# Patient Record
Sex: FEMALE | Employment: UNEMPLOYED | URBAN - METROPOLITAN AREA
[De-identification: names, ages, dates, MRNs, and addresses within clinical notes are randomized per-mention and may not be internally consistent; named-entity substitution may affect disease eponyms.]

---

## 2019-02-13 PROBLEM — G62.9 SMALL FIBER NEUROPATHY: Status: ACTIVE | Noted: 2019-02-13

## 2019-02-13 PROBLEM — M43.17 SPONDYLOLISTHESIS OF LUMBOSACRAL REGION: Status: ACTIVE | Noted: 2019-02-13

## 2019-02-21 ENCOUNTER — HOSPITAL ENCOUNTER (OUTPATIENT)
Dept: PHYSICAL THERAPY | Age: 54
Discharge: HOME | End: 2019-02-21
Attending: PSYCHIATRY & NEUROLOGY
Payer: COMMERCIAL

## 2019-02-21 DIAGNOSIS — M43.17 SPONDYLOLISTHESIS OF LUMBOSACRAL REGION: ICD-10-CM

## 2019-02-21 PROCEDURE — 97162 PT EVAL MOD COMPLEX 30 MIN: CPT

## 2019-02-21 PROCEDURE — 97110 THERAPEUTIC EXERCISES: CPT

## 2019-02-21 NOTE — PROGRESS NOTES
Joan Crews  : 1965  Primary: [employer]  Secondary:  [address] at [place] [id number] [facility], [address] [place] [address],[address], [facility] [address].  [phone]   [phone]           OUTPATIENT PHYSICAL THERAPY:Initial Assessment 2019   ICD-10: Treatment Diagnosis: Low back pain (M54.5)  Precautions/Allergies:   Patient has no known allergies. MD Orders: Evaluate and Treat MEDICAL/REFERRING DIAGNOSIS:  Spondylolisthesis of lumbosacral region [M43.17]   DATE OF ONSET: Chronic LBP  REFERRING PHYSICIAN: Roney Dinh MD  RETURN PHYSICIAN APPOINTMENT: end      INITIAL ASSESSMENT:  Ms. Cony Reyez presents with decreased lumbar ROM, LE strength/flexibility and increased pain leading to decreased functional status. Pt would benefit from skilled physical therapy services to address the above deficits and help patient return to prior level of function. PROBLEM LIST (Impacting functional limitations):  1. Decreased Strength  2. Decreased ADL/Functional Activities  3. Increased Pain  4. Decreased Flexibility/Joint Mobility  5. Decreased [place] with Home Exercise Program INTERVENTIONS PLANNED: (Treatment may consist of any combination of the following)  1. Cold  2. Cryotherapy  3. Electrical Stimulation  4. Heat  5. Home Exercise Program (HEP)  6. Manual Therapy  7. Range of Motion (ROM)  8. Therapeutic Exercise/Strengthening  9. Ultrasound (US)   10. Aquatic Therapy   TREATMENT PLAN:  Effective Dates: 2019 TO 2019 (90 days). Frequency/Duration: 2 times a week for 90 Day(s)  GOALS: (Goals have been discussed and agreed upon with patient.)  Short-Term Functional Goals: Time Frame: 4 weeks  1. Pt will increase lumbar ROM flexion = 80 degrees, side bending = 35 degrees bilaterally to assist with household ADL's  2. Pt will increase SLR 60 degrees bilaterally to assist with household ADL's  3.  Pt will be independent with HEP  Discharge Goals: Time Frame: 12 weeks  1. Pt will increase strength bilateral LE's 5/5 to assist with household ADL's  2. Pt will perform 20 minutes household cleaning activities independently with min to no c/o LBP or LE pain  Rehabilitation Potential For Stated Goals: Good  Regarding Damion Balbuena's therapy, I certify that the treatment plan above will be carried out by a therapist or under their direction. Thank you for this referral,  Jadon Oneill, PT     Referring Physician Signature: Dev Nicholson MD              Date                    The information in this section was collected on 02-21-19 (except where otherwise noted). HISTORY:   History of Present Injury/Illness (Reason for Referral):  Pt reports insidious onset low back and LE pain of greater than 5 years duration. She states she had some physical therapy years ago and was seeing a chiropractor, but the chiropractics made her symptoms worse at her last session. .  She states she has good and bad days. She states the pain hits her at times even if she isn't doing anything. She currently c/o pain in her bilateral low back with pain in her L buttocks and a tingling type sensation down her L LE. She is also getting acupuncture every 3-4 weeks. She states there are no specific aggravating factors or relieving factors. She rates her current pain 1-2/10, increasing to 10/10 at worst.  She is taking Advil prn for her back and LE pain. Pt reports difficulties with vacuuming her house as well as difficulties with prolonged walking due to her back pain. Pt states she has had some massage therapy which made her symptoms worse. Past Medical History/Comorbidities:   Ms. Miguel Adames  has a past medical history of Thyroid disease. Ms. Miguel Adames  has no past surgical history on file. Social History/Living Environment:     Pt lives with spouse in a two-story house.   She denies increased pain with ascending/descending stairs  Prior Level of Function/Work/Activity:  Pt is not employed  Dominant Side:         RIGHT  Other Clinical Tests:          MRI several years ago revealed L5-S1 spondylolisthesis  Personal Factors:          Sex:  female        Age:  48 y.o. Profession:  Pt is not employed     Ambulatory/Rehab Services H2 Model Falls Risk Assessment    Risk Factors:       No Risk Factors Identified Ability to Rise from Chair:       (1)  Pushes up, successful in one attempt    Falls Prevention Plan:       No modifications necessary   Total: (5 or greater = High Risk): 1    ©2010 [facility] [address] [place] [address] Patent #[id number]. Federal Law prohibits the replication, distribution or use without written permission from [facility] [employer]     Current Medications:       Current Outpatient Medications:     levothyroxine (LEVOXYL) 88 mcg tablet, Take 88 mcg by mouth daily. , Disp: , Rfl:     T3 SUSTAINED RELEASE CAP, Take  by mouth daily. T3 Hypomellose, Disp: , Rfl:     cyanocobalamin (VITAMIN B12) 1,000 mcg/mL injection, 1,000 mcg by IntraMUSCular route once., Disp: , Rfl:     clonazePAM (KLONOPIN) 0.25 mg disintegrating tablet, Take 0.25 mg by mouth nightly., Disp: , Rfl:     multivitamin (ONE A DAY) tablet, Take 1 Tab by mouth daily. , Disp: , Rfl:     OTHER,NON-FORMULARY,, Compound hormones, Disp: , Rfl:     OTHER,NON-FORMULARY,, Pancreatic enzymes, Disp: , Rfl:     gabapentin (NEURONTIN) 100 mg capsule, Take 1 Cap by mouth nightly., Disp: 31 Cap, Rfl: 5   Date Last Reviewed:  02-21-19   Number of Personal Factors/Comorbidities that affect the Plan of Care: 1-2: MODERATE COMPLEXITY   EXAMINATION:   Observation/Orthostatic Postural Assessment:           Forward head, rounded shoulders  Palpation:          Mild tenderness bilateral lumbar paraspinals L>R, piriformis, gluteals, hamstrings and gastroc  ROM:    Lumbar flexion = 70 degrees (\"pulling\" in bilateral low back and posterior thighs)  Lumbar extension = not assessed  Lumbar side bending R = 25 degrees  Lumbar side bending L = 25 degrees    Strength:    R hip flexion = 4+/5  R hip abduction = 4+/5  R hip adduction = 5/5  R knee extension = 5/5  R knee flexion = 5/5  R ankle dorsiflexion = 5/5  R ankle plantarflexion = 5/5    L hip flexion = 4+/5  L hip abduction = 4+/5  L hip adduction = 5/5  L knee extension = 5/5  L knee flexion = 5/5  L ankle dorsiflexion = 5/5  L ankle plantarflexion = 5/5    Special Tests:          SLR 45 degrees with hamstring tightness noted bilaterally  Neurological Screen:        Myotomes:  No significant deficits noted        Dermatomes:  Intact to light touch bilaterally        Reflexes:  DTR's 2+ to bilateral achilles and patellar  Functional Mobility:         Gait/Ambulation:  No significant deficits noted        Transfers:  Pt able to transition sit to supine and supine to sit independently    Body Structures Involved:  1. Nerves  2. Bones  3. Joints  4. Muscles Body Functions Affected:  1. Sensory/Pain  2. Neuromusculoskeletal Activities and Participation Affected:  1. Mobility  2. Self Care  3. Domestic Life   Number of elements (examined above) that affect the Plan of Care: 3: MODERATE COMPLEXITY   CLINICAL PRESENTATION:   Presentation: Evolving clinical presentation with changing clinical characteristics: MODERATE COMPLEXITY   CLINICAL DECISION MAKING:   Outcome Measure: Tool Used: Modified Oswestry Low Back Pain Questionnaire  Score:  Initial: 13/50  Most Recent: X/50 (Date: -- )   Interpretation of Score: Each section is scored on a 0-5 scale, 5 representing the greatest disability. The scores of each section are added together for a total score of 50. Medical Necessity:   · Patient is expected to demonstrate progress in strength, range of motion and functional technique to increase independence with household ADL's. · Patient demonstrates good rehab potential due to higher previous functional level.   Reason for Services/Other Comments:  · Patient continues to require skilled intervention due to decreased lumbar ROM, LE strength/flexibility and increased pain leading to decreased functional status. Use of outcome tool(s) and clinical judgement create a POC that gives a: Questionable prediction of patient's progress: MODERATE COMPLEXITY            TREATMENT:   (In addition to Assessment/Re-Assessment sessions the following treatments were rendered)  Pre-treatment Symptoms/Complaints:  LBP, LE pain  Pain: Initial:     1-2/10 Post Session:  1-2/10     THERAPEUTIC EXERCISE: (10 minutes):  Exercises per grid below to improve mobility and strength. Required minimal verbal cues to promote proper body alignment and promote proper body posture. Progressed resistance and repetitions as indicated. Date:  02-21-19 Date:   Date:     Activity/Exercise Parameters Parameters Parameters   Active Hamstring with Ankle Pumps for Nerve Glide 3 reps  10 ankle pumps each     DKTC 3 reps  20 sec holds     Pelvic Tilts 15 reps  5 sec holds                                 [facility] Portal  Treatment/Session Assessment:    · Response to Treatment:  Pt tolerated evaluation and treatments well today with no c/o increased pain. · Compliance with Program/Exercises: Will assess as treatment progresses. · Recommendations/Intent for next treatment session: \"Next visit will focus on advancements to more challenging activities\".   Total Treatment Duration:  10 minutes  PT Patient Time In/Time Out  Time In: 1115  Time Out: [address] Tsai, PT    Future Appointments   Date Time Provider Debby Gerardi   2/26/2019  5:00 PM Vivian Dang, [facility]   2/28/2019  9:45 AM Vivian Dang, PT [facility]   3/25/2019  8:30 AM Freeman Payton MD BSN [facility]   3/27/2019 10:30 AM Susana Mcdonough MD BSND BSND

## 2019-02-26 ENCOUNTER — HOSPITAL ENCOUNTER (OUTPATIENT)
Dept: PHYSICAL THERAPY | Age: 54
Discharge: HOME | End: 2019-02-26
Attending: PSYCHIATRY & NEUROLOGY
Payer: COMMERCIAL

## 2019-02-26 PROCEDURE — 97110 THERAPEUTIC EXERCISES: CPT

## 2019-02-26 PROCEDURE — 97140 MANUAL THERAPY 1/> REGIONS: CPT

## 2019-02-26 PROCEDURE — 97014 ELECTRIC STIMULATION THERAPY: CPT

## 2019-02-26 NOTE — PROGRESS NOTES
Ivette Fournier  : 1965  Primary: [employer] Generic Commercial  Secondary:  [address] at [place] [address] [facility], [address], [address] [facility]  [phone]   [phone]           OUTPATIENT PHYSICAL THERAPY:Daily Note 2019   ICD-10: Treatment Diagnosis: Low back pain (M54.5)  Precautions/Allergies:   Patient has no known allergies. MD Orders: Evaluate and Treat MEDICAL/REFERRING DIAGNOSIS:  Spondylolisthesis, lumbosacral region [M43.17]   DATE OF ONSET: Chronic LBP  REFERRING PHYSICIAN: Felicia Awad MD  RETURN PHYSICIAN APPOINTMENT: end      INITIAL ASSESSMENT:  Ms. Noe Stewart presents with decreased lumbar ROM, LE strength/flexibility and increased pain leading to decreased functional status. Pt would benefit from skilled physical therapy services to address the above deficits and help patient return to prior level of function. PROBLEM LIST (Impacting functional limitations):  1. Decreased Strength  2. Decreased ADL/Functional Activities  3. Increased Pain  4. Decreased Flexibility/Joint Mobility  5. Decreased [place] with Home Exercise Program INTERVENTIONS PLANNED: (Treatment may consist of any combination of the following)  1. Cold  2. Cryotherapy  3. Electrical Stimulation  4. Heat  5. Home Exercise Program (HEP)  6. Manual Therapy  7. Range of Motion (ROM)  8. Therapeutic Exercise/Strengthening  9. Ultrasound (US)   10. Aquatic Therapy   TREATMENT PLAN:  Effective Dates: 2019 TO 2019 (90 days). Frequency/Duration: 2 times a week for 90 Day(s)  GOALS: (Goals have been discussed and agreed upon with patient.)  Short-Term Functional Goals: Time Frame: 4 weeks  1. Pt will increase lumbar ROM flexion = 80 degrees, side bending = 35 degrees bilaterally to assist with household ADL's  2. Pt will increase SLR 60 degrees bilaterally to assist with household ADL's  3.  Pt will be independent with HEP  Discharge Goals: Time Frame: 12 weeks  1. Pt will increase strength bilateral LE's 5/5 to assist with household ADL's  2. Pt will perform 20 minutes household cleaning activities independently with min to no c/o LBP or LE pain  Rehabilitation Potential For Stated Goals: Good  Regarding Damion Balbuena's therapy, I certify that the treatment plan above will be carried out by a therapist or under their direction. Thank you for this referral,  Cintia Khoury, PT     Referring Physician Signature: Amy Smith MD              Date                    The information in this section was collected on 02-21-19 (except where otherwise noted). HISTORY:   History of Present Injury/Illness (Reason for Referral):  Pt reports insidious onset low back and LE pain of greater than 5 years duration. She states she had some physical therapy years ago and was seeing a chiropractor, but the chiropractics made her symptoms worse at her last session. .  She states she has good and bad days. She states the pain hits her at times even if she isn't doing anything. She currently c/o pain in her bilateral low back with pain in her L buttocks and a tingling type sensation down her L LE. She is also getting acupuncture every 3-4 weeks. She states there are no specific aggravating factors or relieving factors. She rates her current pain 1-2/10, increasing to 10/10 at worst.  She is taking Advil prn for her back and LE pain. Pt reports difficulties with vacuuming her house as well as difficulties with prolonged walking due to her back pain. Pt states she has had some massage therapy which made her symptoms worse. Past Medical History/Comorbidities:   Ms. Mikel Yadav  has a past medical history of Thyroid disease. Ms. Mikel Yadav  has no past surgical history on file. Social History/Living Environment:     Pt lives with spouse in a two-story house.   She denies increased pain with ascending/descending stairs  Prior Level of Function/Work/Activity:  Pt is not employed  Dominant Side:         RIGHT  Other Clinical Tests:          MRI several years ago revealed L5-S1 spondylolisthesis  Personal Factors:          Sex:  female        Age:  48 y.o. Profession:  Pt is not employed     Ambulatory/Rehab Services H2 Model Falls Risk Assessment    Risk Factors:       No Risk Factors Identified Ability to Rise from Chair:       (1)  Pushes up, successful in one attempt    Falls Prevention Plan:       No modifications necessary   Total: (5 or greater = High Risk): 1    ©2010 [facility] [address] [place] [address] Patent #[id number]. Federal Law prohibits the replication, distribution or use without written permission from [facility] [employer]     Current Medications:       Current Outpatient Medications:     levothyroxine (LEVOXYL) 88 mcg tablet, Take 88 mcg by mouth daily. , Disp: , Rfl:     T3 SUSTAINED RELEASE CAP, Take  by mouth daily. T3 Hypomellose, Disp: , Rfl:     cyanocobalamin (VITAMIN B12) 1,000 mcg/mL injection, 1,000 mcg by IntraMUSCular route once., Disp: , Rfl:     clonazePAM (KLONOPIN) 0.25 mg disintegrating tablet, Take 0.25 mg by mouth nightly., Disp: , Rfl:     multivitamin (ONE A DAY) tablet, Take 1 Tab by mouth daily. , Disp: , Rfl:     OTHER,NON-FORMULARY,, Compound hormones, Disp: , Rfl:     OTHER,NON-FORMULARY,, Pancreatic enzymes, Disp: , Rfl:     gabapentin (NEURONTIN) 100 mg capsule, Take 1 Cap by mouth nightly., Disp: 31 Cap, Rfl: 5   Date Last Reviewed:  02-21-19   Number of Personal Factors/Comorbidities that affect the Plan of Care: 1-2: MODERATE COMPLEXITY   EXAMINATION:   Observation/Orthostatic Postural Assessment:           Forward head, rounded shoulders  Palpation:          Mild tenderness bilateral lumbar paraspinals L>R, piriformis, gluteals, hamstrings and gastroc  ROM:    Lumbar flexion = 70 degrees (\"pulling\" in bilateral low back and posterior thighs)  Lumbar extension = not assessed  Lumbar side bending R = 25 degrees  Lumbar side bending L = 25 degrees    Strength:    R hip flexion = 4+/5  R hip abduction = 4+/5  R hip adduction = 5/5  R knee extension = 5/5  R knee flexion = 5/5  R ankle dorsiflexion = 5/5  R ankle plantarflexion = 5/5    L hip flexion = 4+/5  L hip abduction = 4+/5  L hip adduction = 5/5  L knee extension = 5/5  L knee flexion = 5/5  L ankle dorsiflexion = 5/5  L ankle plantarflexion = 5/5    Special Tests:          SLR 45 degrees with hamstring tightness noted bilaterally  Neurological Screen:        Myotomes:  No significant deficits noted        Dermatomes:  Intact to light touch bilaterally        Reflexes:  DTR's 2+ to bilateral achilles and patellar  Functional Mobility:         Gait/Ambulation:  No significant deficits noted        Transfers:  Pt able to transition sit to supine and supine to sit independently    Body Structures Involved:  1. Nerves  2. Bones  3. Joints  4. Muscles Body Functions Affected:  1. Sensory/Pain  2. Neuromusculoskeletal Activities and Participation Affected:  1. Mobility  2. Self Care  3. Domestic Life   Number of elements (examined above) that affect the Plan of Care: 3: MODERATE COMPLEXITY   CLINICAL PRESENTATION:   Presentation: Evolving clinical presentation with changing clinical characteristics: MODERATE COMPLEXITY   CLINICAL DECISION MAKING:   Outcome Measure: Tool Used: Modified Oswestry Low Back Pain Questionnaire  Score:  Initial: 13/50  Most Recent: X/50 (Date: -- )   Interpretation of Score: Each section is scored on a 0-5 scale, 5 representing the greatest disability. The scores of each section are added together for a total score of 50. Medical Necessity:   · Patient is expected to demonstrate progress in strength, range of motion and functional technique to increase independence with household ADL's. · Patient demonstrates good rehab potential due to higher previous functional level.   Reason for Services/Other Comments:  · Patient continues to require skilled intervention due to decreased lumbar ROM, LE strength/flexibility and increased pain leading to decreased functional status. Use of outcome tool(s) and clinical judgement create a POC that gives a: Questionable prediction of patient's progress: MODERATE COMPLEXITY            TREATMENT:   (In addition to Assessment/Re-Assessment sessions the following treatments were rendered)  Pre-treatment Symptoms/Complaints:  Pt states she went to acupuncture earlier and she is feeling pretty good today. Pain: Initial:     1/10 Post Session:  1/10     THERAPEUTIC EXERCISE: (20 minutes):  Exercises per grid below to improve mobility and strength. Required minimal verbal cues to promote proper body alignment and promote proper body posture. Progressed resistance and repetitions as indicated. MANUAL THERAPY: (8 minutes): Manual L LE Traction was utilized and necessary because of the patient's L LE radicular symptoms. MODALITIES: (15 minutes): Interferential electrical stimulation with moist heat to low back in side lying x15 minutes to decrease pain. Skin clear afterwards. Date:  02-21-19 Date:  02-26-19 Date:     Activity/Exercise Parameters Parameters Parameters   Active Hamstring with Ankle Pumps for Nerve Glide 3 reps  10 ankle pumps each 3 reps  10 ankle pumps each    DKTC 3 reps  20 sec holds 3 reps  20 sec holds    Pelvic Tilts 15 reps  5 sec holds 15 reps  5 sec holds    Piriformis Stretch  3 reps  20 sec holds    Supine Marching  20 reps    SLR Flexion with TA  10 reps  5 sec holds    Isometric Unilateral Hip Flexion  5 reps  10 sec holds    T-band Hip Abduction  Green  20 reps    NuStep  Level 3  5 minutes        [facility] Portal  Treatment/Session Assessment:    · Response to Treatment:  Pt tolerated treatments well today with no c/o increased pain. She stated manual LE traction felt good to her low back and L LE.   Added Supine Marching, SLR Flexion with TA and Isometric Unilateral Hip Flexion to HEP. · Compliance with Program/Exercises: Will assess as treatment progresses. · Recommendations/Intent for next treatment session: \"Next visit will focus on advancements to more challenging activities\".   Total Treatment Duration:  43  minutes  PT Patient Time In/Time Out  Time In: 8941  Time Out: 0132 [facility],     Future Appointments   Date Time Provider Debby Douglass   2/28/2019  9:45 AM David Edmondson PT [facility]   3/25/2019  8:30 AM Clover Ogden MD BSN [facility]   3/27/2019 10:30 AM McBurney, Heddy Blitz, MD BSND BSND

## 2019-02-28 ENCOUNTER — HOSPITAL ENCOUNTER (OUTPATIENT)
Dept: PHYSICAL THERAPY | Age: 54
Discharge: HOME | End: 2019-02-28
Attending: PSYCHIATRY & NEUROLOGY
Payer: COMMERCIAL

## 2019-02-28 PROCEDURE — 97110 THERAPEUTIC EXERCISES: CPT

## 2019-02-28 PROCEDURE — 97014 ELECTRIC STIMULATION THERAPY: CPT

## 2019-02-28 NOTE — PROGRESS NOTES
Fabiola Zoe  : 1965  Primary: [employer] Generic Commercial  Secondary:  [address]  at [facility]  [address] , [address], [address] [facility]  [phone]   [phone]           OUTPATIENT PHYSICAL THERAPY:Daily Note 2019   ICD-10: Treatment Diagnosis: Low back pain (M54.5)  Precautions/Allergies:   Patient has no known allergies. MD Orders: Evaluate and Treat MEDICAL/REFERRING DIAGNOSIS:  Spondylolisthesis, lumbosacral region [M43.17]   DATE OF ONSET: Chronic LBP  REFERRING PHYSICIAN: Darren Rob MD  RETURN PHYSICIAN APPOINTMENT: end      INITIAL ASSESSMENT:  Ms. Zeeshan Becker presents with decreased lumbar ROM, LE strength/flexibility and increased pain leading to decreased functional status. Pt would benefit from skilled physical therapy services to address the above deficits and help patient return to prior level of function. PROBLEM LIST (Impacting functional limitations):  1. Decreased Strength  2. Decreased ADL/Functional Activities  3. Increased Pain  4. Decreased Flexibility/Joint Mobility  5. Decreased [place] with Home Exercise Program INTERVENTIONS PLANNED: (Treatment may consist of any combination of the following)  1. Cold  2. Cryotherapy  3. Electrical Stimulation  4. Heat  5. Home Exercise Program (HEP)  6. Manual Therapy  7. Range of Motion (ROM)  8. Therapeutic Exercise/Strengthening  9. Ultrasound (US)   10. Aquatic Therapy   TREATMENT PLAN:  Effective Dates: 2019 TO 2019 (90 days). Frequency/Duration: 2 times a week for 90 Day(s)  GOALS: (Goals have been discussed and agreed upon with patient.)  Short-Term Functional Goals: Time Frame: 4 weeks  1. Pt will increase lumbar ROM flexion = 80 degrees, side bending = 35 degrees bilaterally to assist with household ADL's  2. Pt will increase SLR 60 degrees bilaterally to assist with household ADL's  3.  Pt will be independent with HEP  Discharge Goals: Time Frame: 12 weeks  1. Pt will increase strength bilateral LE's 5/5 to assist with household ADL's  2. Pt will perform 20 minutes household cleaning activities independently with min to no c/o LBP or LE pain  Rehabilitation Potential For Stated Goals: Good  Regarding Damion Balbuena's therapy, I certify that the treatment plan above will be carried out by a therapist or under their direction. Thank you for this referral,  Amrita Andersen, PT     Referring Physician Signature: Susana Mcdonough MD              Date                    The information in this section was collected on 02-21-19 (except where otherwise noted). HISTORY:   History of Present Injury/Illness (Reason for Referral):  Pt reports insidious onset low back and LE pain of greater than 5 years duration. She states she had some physical therapy years ago and was seeing a chiropractor, but the chiropractics made her symptoms worse at her last session. .  She states she has good and bad days. She states the pain hits her at times even if she isn't doing anything. She currently c/o pain in her bilateral low back with pain in her L buttocks and a tingling type sensation down her L LE. She is also getting acupuncture every 3-4 weeks. She states there are no specific aggravating factors or relieving factors. She rates her current pain 1-2/10, increasing to 10/10 at worst.  She is taking Advil prn for her back and LE pain. Pt reports difficulties with vacuuming her house as well as difficulties with prolonged walking due to her back pain. Pt states she has had some massage therapy which made her symptoms worse. Past Medical History/Comorbidities:   Ms. Allie Rashid  has a past medical history of Thyroid disease. Ms. Allie Rashid  has no past surgical history on file. Social History/Living Environment:     Pt lives with spouse in a two-story house.   She denies increased pain with ascending/descending stairs  Prior Level of Function/Work/Activity:  Pt is not employed  Dominant Side:         RIGHT  Other Clinical Tests:          MRI several years ago revealed L5-S1 spondylolisthesis  Personal Factors:          Sex:  female        Age:  48 y.o. Profession:  Pt is not employed     Ambulatory/Rehab Services H2 Model Falls Risk Assessment    Risk Factors:       No Risk Factors Identified Ability to Rise from Chair:       (1)  Pushes up, successful in one attempt    Falls Prevention Plan:       No modifications necessary   Total: (5 or greater = High Risk): 1    ©2010 [facility] [address] [place] [address] Patent #[id number]. Federal Law prohibits the replication, distribution or use without written permission from [facility] [employer]     Current Medications:       Current Outpatient Medications:     levothyroxine (LEVOXYL) 88 mcg tablet, Take 88 mcg by mouth daily. , Disp: , Rfl:     T3 SUSTAINED RELEASE CAP, Take  by mouth daily. T3 Hypomellose, Disp: , Rfl:     cyanocobalamin (VITAMIN B12) 1,000 mcg/mL injection, 1,000 mcg by IntraMUSCular route once., Disp: , Rfl:     clonazePAM (KLONOPIN) 0.25 mg disintegrating tablet, Take 0.25 mg by mouth nightly., Disp: , Rfl:     multivitamin (ONE A DAY) tablet, Take 1 Tab by mouth daily. , Disp: , Rfl:     OTHER,NON-FORMULARY,, Compound hormones, Disp: , Rfl:     OTHER,NON-FORMULARY,, Pancreatic enzymes, Disp: , Rfl:     gabapentin (NEURONTIN) 100 mg capsule, Take 1 Cap by mouth nightly., Disp: 31 Cap, Rfl: 5   Date Last Reviewed:  02-21-19   Number of Personal Factors/Comorbidities that affect the Plan of Care: 1-2: MODERATE COMPLEXITY   EXAMINATION:   Observation/Orthostatic Postural Assessment:           Forward head, rounded shoulders  Palpation:          Mild tenderness bilateral lumbar paraspinals L>R, piriformis, gluteals, hamstrings and gastroc  ROM:    Lumbar flexion = 70 degrees (\"pulling\" in bilateral low back and posterior thighs)  Lumbar extension = not assessed  Lumbar side bending R = 25 degrees  Lumbar side bending L = 25 degrees    Strength:    R hip flexion = 4+/5  R hip abduction = 4+/5  R hip adduction = 5/5  R knee extension = 5/5  R knee flexion = 5/5  R ankle dorsiflexion = 5/5  R ankle plantarflexion = 5/5    L hip flexion = 4+/5  L hip abduction = 4+/5  L hip adduction = 5/5  L knee extension = 5/5  L knee flexion = 5/5  L ankle dorsiflexion = 5/5  L ankle plantarflexion = 5/5    Special Tests:          SLR 45 degrees with hamstring tightness noted bilaterally  Neurological Screen:        Myotomes:  No significant deficits noted        Dermatomes:  Intact to light touch bilaterally        Reflexes:  DTR's 2+ to bilateral achilles and patellar  Functional Mobility:         Gait/Ambulation:  No significant deficits noted        Transfers:  Pt able to transition sit to supine and supine to sit independently    Body Structures Involved:  1. Nerves  2. Bones  3. Joints  4. Muscles Body Functions Affected:  1. Sensory/Pain  2. Neuromusculoskeletal Activities and Participation Affected:  1. Mobility  2. Self Care  3. Domestic Life   Number of elements (examined above) that affect the Plan of Care: 3: MODERATE COMPLEXITY   CLINICAL PRESENTATION:   Presentation: Evolving clinical presentation with changing clinical characteristics: MODERATE COMPLEXITY   CLINICAL DECISION MAKING:   Outcome Measure: Tool Used: Modified Oswestry Low Back Pain Questionnaire  Score:  Initial: 13/50  Most Recent: X/50 (Date: -- )   Interpretation of Score: Each section is scored on a 0-5 scale, 5 representing the greatest disability. The scores of each section are added together for a total score of 50. Medical Necessity:   · Patient is expected to demonstrate progress in strength, range of motion and functional technique to increase independence with household ADL's. · Patient demonstrates good rehab potential due to higher previous functional level.   Reason for Services/Other Comments:  · Patient continues to require skilled intervention due to decreased lumbar ROM, LE strength/flexibility and increased pain leading to decreased functional status. Use of outcome tool(s) and clinical judgement create a POC that gives a: Questionable prediction of patient's progress: MODERATE COMPLEXITY            TREATMENT:   (In addition to Assessment/Re-Assessment sessions the following treatments were rendered)  Pre-treatment Symptoms/Complaints:  Pt reports some L knee pain following last session. She reports history of L knee problems. Pain: Initial:     3/10 Post Session:  1/10     THERAPEUTIC EXERCISE: (30 minutes):  Exercises per grid below to improve mobility and strength. Required minimal verbal cues to promote proper body alignment and promote proper body posture. Progressed resistance and repetitions as indicated. MANUAL THERAPY: (5 minutes): Manual L LE Traction was utilized and necessary because of the patient's L LE radicular symptoms. MODALITIES: (15 minutes): Interferential electrical stimulation with moist heat to low back in side lying x15 minutes to decrease pain. Skin clear afterwards.     Date:  02-21-19 Date:  02-26-19 Date:  02-28-19   Activity/Exercise Parameters Parameters Parameters   Active Hamstring with Ankle Pumps for Nerve Glide 3 reps  10 ankle pumps each 3 reps  10 ankle pumps each 3 reps  10 ankle pumps each   DKTC 3 reps  20 sec holds 3 reps  20 sec holds 3 reps  20 sec holds   Pelvic Tilts 15 reps  5 sec holds 15 reps  5 sec holds 15 reps  5 sec holds   Piriformis Stretch  3 reps  20 sec holds 3 reps  20 sec holds   Supine Marching  20 reps 20 reps   SLR Flexion with TA  10 reps  5 sec holds 10 reps  5 sec holds   Isometric Unilateral Hip Flexion  5 reps  10 sec holds 5 reps  10 sec holds   T-band Hip Abduction  Green  20 reps    NuStep  Level 3  5 minutes    Wall Squats with Swiss Ball   15 reps  5 sec holds   T-band Rows and Straight Arm Pulldowns   Red  15 reps each Bridging   10 reps  5 sec holds   [employer]   3 reps  15 sec holds       AorTx  Treatment/Session Assessment:    · Response to Treatment:  Pt tolerated treatments well today with no c/o increased pain. She tolerated new exercises well today. · Compliance with Program/Exercises: Will assess as treatment progresses. · Recommendations/Intent for next treatment session: \"Next visit will focus on advancements to more challenging activities\".   Total Treatment Duration:  50  minutes  PT Patient Time In/Time Out  Time In: 1119  Time Out: [address] [facility], PT    Future Appointments   Date Time Provider Debby Douglass   3/25/2019  8:30 AM Eva Olivas MD BSN [facility]   3/27/2019 10:30 AM Loreda Lundborg, MD BSND BSND

## 2019-03-05 ENCOUNTER — HOSPITAL ENCOUNTER (OUTPATIENT)
Dept: PHYSICAL THERAPY | Age: 54
Discharge: HOME | End: 2019-03-05
Attending: PSYCHIATRY & NEUROLOGY
Payer: COMMERCIAL

## 2019-03-05 PROCEDURE — 97110 THERAPEUTIC EXERCISES: CPT

## 2019-03-05 PROCEDURE — 97014 ELECTRIC STIMULATION THERAPY: CPT

## 2019-03-05 NOTE — PROGRESS NOTES
Diana Puente  : 1965  Primary: LWZ[employer] Generic Commercial  Secondary:  Patrick Khoury at [place] [id number] [facility], [address], [facility] [address]  [phone]   [phone]           OUTPATIENT PHYSICAL THERAPY:Daily Note 3/5/2019   ICD-10: Treatment Diagnosis: Low back pain (M54.5)  Precautions/Allergies:   Patient has no known allergies. MD Orders: Evaluate and Treat MEDICAL/REFERRING DIAGNOSIS:  Spondylolisthesis, lumbosacral region [M43.17]   DATE OF ONSET: Chronic LBP  REFERRING PHYSICIAN: Ted Max MD  RETURN PHYSICIAN APPOINTMENT: end      INITIAL ASSESSMENT:  Ms. Chyna Brown presents with decreased lumbar ROM, LE strength/flexibility and increased pain leading to decreased functional status. Pt would benefit from skilled physical therapy services to address the above deficits and help patient return to prior level of function. PROBLEM LIST (Impacting functional limitations):  1. Decreased Strength  2. Decreased ADL/Functional Activities  3. Increased Pain  4. Decreased Flexibility/Joint Mobility  5. Decreased [place] with Home Exercise Program INTERVENTIONS PLANNED: (Treatment may consist of any combination of the following)  1. Cold  2. Cryotherapy  3. Electrical Stimulation  4. Heat  5. Home Exercise Program (HEP)  6. Manual Therapy  7. Range of Motion (ROM)  8. Therapeutic Exercise/Strengthening  9. Ultrasound (US)   10. Aquatic Therapy   TREATMENT PLAN:  Effective Dates: 2019 TO 2019 (90 days). Frequency/Duration: 2 times a week for 90 Day(s)  GOALS: (Goals have been discussed and agreed upon with patient.)  Short-Term Functional Goals: Time Frame: 4 weeks  1. Pt will increase lumbar ROM flexion = 80 degrees, side bending = 35 degrees bilaterally to assist with household ADL's  2. Pt will increase SLR 60 degrees bilaterally to assist with household ADL's  3.  Pt will be independent with HEP  Discharge Goals: Time Frame: 12 weeks  1. Pt will increase strength bilateral LE's 5/5 to assist with household ADL's  2. Pt will perform 20 minutes household cleaning activities independently with min to no c/o LBP or LE pain  Rehabilitation Potential For Stated Goals: Good  Regarding Damion Balbuena's therapy, I certify that the treatment plan above will be carried out by a therapist or under their direction. Thank you for this referral,  Ray Olivo PT     Referring Physician Signature: Nik Barnes MD              Date                    The information in this section was collected on 02-21-19 (except where otherwise noted). HISTORY:   History of Present Injury/Illness (Reason for Referral):  Pt reports insidious onset low back and LE pain of greater than 5 years duration. She states she had some physical therapy years ago and was seeing a chiropractor, but the chiropractics made her symptoms worse at her last session. .  She states she has good and bad days. She states the pain hits her at times even if she isn't doing anything. She currently c/o pain in her bilateral low back with pain in her L buttocks and a tingling type sensation down her L LE. She is also getting acupuncture every 3-4 weeks. She states there are no specific aggravating factors or relieving factors. She rates her current pain 1-2/10, increasing to 10/10 at worst.  She is taking Advil prn for her back and LE pain. Pt reports difficulties with vacuuming her house as well as difficulties with prolonged walking due to her back pain. Pt states she has had some massage therapy which made her symptoms worse. Past Medical History/Comorbidities:   Ms. Jesu Angeles  has a past medical history of Thyroid disease. Ms. Jesu Angeles  has no past surgical history on file. Social History/Living Environment:     Pt lives with spouse in a two-story house.   She denies increased pain with ascending/descending stairs  Prior Level of Function/Work/Activity:  Pt is not employed  Dominant Side:         RIGHT  Other Clinical Tests:          MRI several years ago revealed L5-S1 spondylolisthesis  Personal Factors:          Sex:  female        Age:  48 y.o. Profession:  Pt is not employed     Ambulatory/Rehab Services H2 Model Falls Risk Assessment    Risk Factors:       No Risk Factors Identified Ability to Rise from Chair:       (1)  Pushes up, successful in one attempt    Falls Prevention Plan:       No modifications necessary   Total: (5 or greater = High Risk): 1    ©2010 [facility] [address] [place] [address] Patent #[id number]. Federal Law prohibits the replication, distribution or use without written permission from [facility] [employer]     Current Medications:       Current Outpatient Medications:     levothyroxine (LEVOXYL) 88 mcg tablet, Take 88 mcg by mouth daily. , Disp: , Rfl:     T3 SUSTAINED RELEASE CAP, Take  by mouth daily. T3 Hypomellose, Disp: , Rfl:     cyanocobalamin (VITAMIN B12) 1,000 mcg/mL injection, 1,000 mcg by IntraMUSCular route once., Disp: , Rfl:     clonazePAM (KLONOPIN) 0.25 mg disintegrating tablet, Take 0.25 mg by mouth nightly., Disp: , Rfl:     multivitamin (ONE A DAY) tablet, Take 1 Tab by mouth daily. , Disp: , Rfl:     OTHER,NON-FORMULARY,, Compound hormones, Disp: , Rfl:     OTHER,NON-FORMULARY,, Pancreatic enzymes, Disp: , Rfl:     gabapentin (NEURONTIN) 100 mg capsule, Take 1 Cap by mouth nightly., Disp: 31 Cap, Rfl: 5   Date Last Reviewed:  02-21-19   Number of Personal Factors/Comorbidities that affect the Plan of Care: 1-2: MODERATE COMPLEXITY   EXAMINATION:   Observation/Orthostatic Postural Assessment:           Forward head, rounded shoulders  Palpation:          Mild tenderness bilateral lumbar paraspinals L>R, piriformis, gluteals, hamstrings and gastroc  ROM:    Lumbar flexion = 70 degrees (\"pulling\" in bilateral low back and posterior thighs)  Lumbar extension = not assessed  Lumbar side bending R = 25 degrees  Lumbar side bending L = 25 degrees    Strength:    R hip flexion = 4+/5  R hip abduction = 4+/5  R hip adduction = 5/5  R knee extension = 5/5  R knee flexion = 5/5  R ankle dorsiflexion = 5/5  R ankle plantarflexion = 5/5    L hip flexion = 4+/5  L hip abduction = 4+/5  L hip adduction = 5/5  L knee extension = 5/5  L knee flexion = 5/5  L ankle dorsiflexion = 5/5  L ankle plantarflexion = 5/5    Special Tests:          SLR 45 degrees with hamstring tightness noted bilaterally  Neurological Screen:        Myotomes:  No significant deficits noted        Dermatomes:  Intact to light touch bilaterally        Reflexes:  DTR's 2+ to bilateral achilles and patellar  Functional Mobility:         Gait/Ambulation:  No significant deficits noted        Transfers:  Pt able to transition sit to supine and supine to sit independently    Body Structures Involved:  1. Nerves  2. Bones  3. Joints  4. Muscles Body Functions Affected:  1. Sensory/Pain  2. Neuromusculoskeletal Activities and Participation Affected:  1. Mobility  2. Self Care  3. Domestic Life   Number of elements (examined above) that affect the Plan of Care: 3: MODERATE COMPLEXITY   CLINICAL PRESENTATION:   Presentation: Evolving clinical presentation with changing clinical characteristics: MODERATE COMPLEXITY   CLINICAL DECISION MAKING:   Outcome Measure: Tool Used: Modified Oswestry Low Back Pain Questionnaire  Score:  Initial: 13/50  Most Recent: X/50 (Date: -- )   Interpretation of Score: Each section is scored on a 0-5 scale, 5 representing the greatest disability. The scores of each section are added together for a total score of 50. Medical Necessity:   · Patient is expected to demonstrate progress in strength, range of motion and functional technique to increase independence with household ADL's. · Patient demonstrates good rehab potential due to higher previous functional level.   Reason for Services/Other Comments:  · Patient continues to require skilled intervention due to decreased lumbar ROM, LE strength/flexibility and increased pain leading to decreased functional status. Use of outcome tool(s) and clinical judgement create a POC that gives a: Questionable prediction of patient's progress: MODERATE COMPLEXITY            TREATMENT:   (In addition to Assessment/Re-Assessment sessions the following treatments were rendered)  Pre-treatment Symptoms/Complaints:  Pt reports soreness and stiffness in low back. Pain: Initial:     2/10 Post Session:  1/10     THERAPEUTIC EXERCISE: (30 minutes):  Exercises per grid below to improve mobility and strength. Required minimal verbal cues to promote proper body alignment and promote proper body posture. Progressed resistance and repetitions as indicated. MANUAL THERAPY: (5 minutes): Manual L LE Traction was utilized and necessary because of the patient's L LE radicular symptoms. MODALITIES: (15 minutes): Interferential electrical stimulation with moist heat to low back in side lying x15 minutes to decrease pain. Skin clear afterwards.     Date:  02-21-19 Date:  02-26-19 Date:  02-28-19 Date  03-05-19   Activity/Exercise Parameters Parameters Parameters    Active Hamstring with Ankle Pumps for Nerve Glide 3 reps  10 ankle pumps each 3 reps  10 ankle pumps each 3 reps  10 ankle pumps each 3 reps  10 ankle pumps each   DKTC 3 reps  20 sec holds 3 reps  20 sec holds 3 reps  20 sec holds 3 reps  20 sec holds   Pelvic Tilts 15 reps  5 sec holds 15 reps  5 sec holds 15 reps  5 sec holds 15 reps  5 sec holds   Piriformis Stretch  3 reps  20 sec holds 3 reps  20 sec holds 3 reps  20 sec holds   Supine Marching  20 reps 20 reps 20 reps   SLR Flexion with TA  10 reps  5 sec holds 10 reps  5 sec holds 10 reps  5 sec holds   Isometric Unilateral Hip Flexion  5 reps  10 sec holds 5 reps  10 sec holds 5 reps  10 sec holds   T-band Hip Abduction  Green  20 reps     NuStep  Level 3  5 minutes     Wall Squats with [employer]   15 reps  5 sec holds 15 reps  5 sec holds   T-band Rows and Straight Arm Pulldowns   Red  15 reps each Green  20 reps each   Bridging   10 reps  5 sec holds 10 reps  5 sec holds   Adams Pose   3 reps  15 sec holds 3 reps  15 sec holds       [employer] Portal  Treatment/Session Assessment:    · Response to Treatment:  Pt tolerated all treatments well today. Possible discharge on Thursday to independent [facility]. · Compliance with Program/Exercises: Will assess as treatment progresses. · Recommendations/Intent for next treatment session: \"Next visit will focus on advancements to more challenging activities\".   Total Treatment Duration:  50  minutes  PT Patient Time In/Time Out  Time In: 1300  [facility],     Future Appointments   Date Time Provider Debby Douglass   3/7/2019 11:15 AM Rodger Martínez PT [facility]   3/25/2019  8:30 AM Elza Norris MD BSN [facility]   3/27/2019 10:30 AM McBurney, Leretha Chamberlain, MD BSND BSND

## 2019-03-07 ENCOUNTER — HOSPITAL ENCOUNTER (OUTPATIENT)
Dept: PHYSICAL THERAPY | Age: 54
Discharge: HOME | End: 2019-03-07
Attending: PSYCHIATRY & NEUROLOGY
Payer: COMMERCIAL

## 2019-03-07 PROCEDURE — 97014 ELECTRIC STIMULATION THERAPY: CPT

## 2019-03-07 PROCEDURE — 97110 THERAPEUTIC EXERCISES: CPT

## 2019-03-07 NOTE — PROGRESS NOTES
Julia Alvares  : 1965  Primary: [employer]  Secondary:  [id number] at [facility]  [address] [facility], [address] [place] [address],[address], [address] [facility]  [phone]   [phone]           OUTPATIENT PHYSICAL THERAPY:Daily Note and Discharge Summary 3/7/2019   ICD-10: Treatment Diagnosis: Low back pain (M54.5)  Precautions/Allergies:   Patient has no known allergies. MD Orders: Evaluate and Treat MEDICAL/REFERRING DIAGNOSIS:  Spondylolisthesis, lumbosacral region [M43.17]   DATE OF ONSET: Chronic LBP  REFERRING PHYSICIAN: Nik Barnes MD  RETURN PHYSICIAN APPOINTMENT: end      INITIAL ASSESSMENT:  Ms. Jesu Angeles presents with decreased lumbar ROM, LE strength/flexibility and increased pain leading to decreased functional status. Pt would benefit from skilled physical therapy services to address the above deficits and help patient return to prior level of function. PROBLEM LIST (Impacting functional limitations):  1. Decreased Strength  2. Decreased ADL/Functional Activities  3. Increased Pain  4. Decreased Flexibility/Joint Mobility  5. Decreased [place] with Home Exercise Program INTERVENTIONS PLANNED: (Treatment may consist of any combination of the following)  1. Cold  2. Cryotherapy  3. Electrical Stimulation  4. Heat  5. Home Exercise Program (HEP)  6. Manual Therapy  7. Range of Motion (ROM)  8. Therapeutic Exercise/Strengthening  9. Ultrasound (US)   10. Aquatic Therapy   TREATMENT PLAN:  Effective Dates: 2019 TO 2019 (90 days). Frequency/Duration: 2 times a week for 90 Day(s)  GOALS: (Goals have been discussed and agreed upon with patient.)  Short-Term Functional Goals: Time Frame: 4 weeks  1. Pt will increase lumbar ROM flexion = 80 degrees, side bending = 35 degrees bilaterally to assist with household ADL's  2. Pt will increase SLR 60 degrees bilaterally to assist with household ADL's  3.  Pt will be independent with HEP  Discharge Goals: Time Frame: 12 weeks  1. Pt will increase strength bilateral LE's 5/5 to assist with household ADL's  2. Pt will perform 20 minutes household cleaning activities independently with min to no c/o LBP or LE pain  Rehabilitation Potential For Stated Goals: Good  Regarding Damion Balbuena's therapy, I certify that the treatment plan above will be carried out by a therapist or under their direction. Thank you for this referral,  Ave Ribera, PT     Referring Physician Signature: Darshan Garcias MD              Date                    The information in this section was collected on 02-21-19 (except where otherwise noted). HISTORY:   History of Present Injury/Illness (Reason for Referral):  Pt reports insidious onset low back and LE pain of greater than 5 years duration. She states she had some physical therapy years ago and was seeing a chiropractor, but the chiropractics made her symptoms worse at her last session. .  She states she has good and bad days. She states the pain hits her at times even if she isn't doing anything. She currently c/o pain in her bilateral low back with pain in her L buttocks and a tingling type sensation down her L LE. She is also getting acupuncture every 3-4 weeks. She states there are no specific aggravating factors or relieving factors. She rates her current pain 1-2/10, increasing to 10/10 at worst.  She is taking Advil prn for her back and LE pain. Pt reports difficulties with vacuuming her house as well as difficulties with prolonged walking due to her back pain. Pt states she has had some massage therapy which made her symptoms worse. Past Medical History/Comorbidities:   Ms. Flavia Werner  has a past medical history of Thyroid disease. Ms. Flavia Werner  has no past surgical history on file. Social History/Living Environment:     Pt lives with spouse in a two-story house.   She denies increased pain with ascending/descending stairs  Prior Level of Function/Work/Activity:  Pt is not employed  Dominant Side:         RIGHT  Other Clinical Tests:          MRI several years ago revealed L5-S1 spondylolisthesis  Personal Factors:          Sex:  female        Age:  48 y.o. Profession:  Pt is not employed     Ambulatory/Rehab Services H2 Model Falls Risk Assessment    Risk Factors:       No Risk Factors Identified Ability to Rise from Chair:       (1)  Pushes up, successful in one attempt    Falls Prevention Plan:       No modifications necessary   Total: (5 or greater = High Risk): 1    ©2010 [facility] [address] [place] [address] Patent #[id number]. Federal Law prohibits the replication, distribution or use without written permission from [facility]     Current Medications:       Current Outpatient Medications:     levothyroxine (LEVOXYL) 88 mcg tablet, Take 88 mcg by mouth daily. , Disp: , Rfl:     T3 SUSTAINED RELEASE CAP, Take  by mouth daily. T3 Hypomellose, Disp: , Rfl:     cyanocobalamin (VITAMIN B12) 1,000 mcg/mL injection, 1,000 mcg by IntraMUSCular route once., Disp: , Rfl:     clonazePAM (KLONOPIN) 0.25 mg disintegrating tablet, Take 0.25 mg by mouth nightly., Disp: , Rfl:     multivitamin (ONE A DAY) tablet, Take 1 Tab by mouth daily. , Disp: , Rfl:     OTHER,NON-FORMULARY,, Compound hormones, Disp: , Rfl:     OTHER,NON-FORMULARY,, Pancreatic enzymes, Disp: , Rfl:     gabapentin (NEURONTIN) 100 mg capsule, Take 1 Cap by mouth nightly., Disp: 31 Cap, Rfl: 5   Date Last Reviewed:  02-21-19   Number of Personal Factors/Comorbidities that affect the Plan of Care: 1-2: MODERATE COMPLEXITY   EXAMINATION:   Observation/Orthostatic Postural Assessment:           Forward head, rounded shoulders  Palpation:          Mild tenderness bilateral lumbar paraspinals L>R, piriformis, gluteals, hamstrings and gastroc  ROM:    Lumbar flexion = 70 degrees (\"pulling\" in bilateral low back and posterior thighs)  Lumbar extension = not assessed  Lumbar side bending R = 25 degrees  Lumbar side bending L = 25 degrees    Strength:    R hip flexion = 4+/5  R hip abduction = 4+/5  R hip adduction = 5/5  R knee extension = 5/5  R knee flexion = 5/5  R ankle dorsiflexion = 5/5  R ankle plantarflexion = 5/5    L hip flexion = 4+/5  L hip abduction = 4+/5  L hip adduction = 5/5  L knee extension = 5/5  L knee flexion = 5/5  L ankle dorsiflexion = 5/5  L ankle plantarflexion = 5/5    Special Tests:          SLR 45 degrees with hamstring tightness noted bilaterally  Neurological Screen:        Myotomes:  No significant deficits noted        Dermatomes:  Intact to light touch bilaterally        Reflexes:  DTR's 2+ to bilateral achilles and patellar  Functional Mobility:         Gait/Ambulation:  No significant deficits noted        Transfers:  Pt able to transition sit to supine and supine to sit independently    Body Structures Involved:  1. Nerves  2. Bones  3. Joints  4. Muscles Body Functions Affected:  1. Sensory/Pain  2. Neuromusculoskeletal Activities and Participation Affected:  1. Mobility  2. Self Care  3. Domestic Life   Number of elements (examined above) that affect the Plan of Care: 3: MODERATE COMPLEXITY   CLINICAL PRESENTATION:   Presentation: Evolving clinical presentation with changing clinical characteristics: MODERATE COMPLEXITY   CLINICAL DECISION MAKING:   Outcome Measure: Tool Used: Modified Oswestry Low Back Pain Questionnaire  Score:  Initial: 13/50  Most Recent: X/50 (Date: -- )   Interpretation of Score: Each section is scored on a 0-5 scale, 5 representing the greatest disability. The scores of each section are added together for a total score of 50. Medical Necessity:   · Patient is expected to demonstrate progress in strength, range of motion and functional technique to increase independence with household ADL's. · Patient demonstrates good rehab potential due to higher previous functional level.   Reason for Services/Other Comments:  · Patient continues to require skilled intervention due to decreased lumbar ROM, LE strength/flexibility and increased pain leading to decreased functional status. Use of outcome tool(s) and clinical judgement create a POC that gives a: Questionable prediction of patient's progress: MODERATE COMPLEXITY            TREATMENT:   (In addition to Assessment/Re-Assessment sessions the following treatments were rendered)  Pre-treatment Symptoms/Complaints:  Pt states her back feels stiff today. Pain: Initial:     2/10 Post Session:  1/10     THERAPEUTIC EXERCISE: (30 minutes):  Exercises per grid below to improve mobility and strength. Required minimal verbal cues to promote proper body alignment and promote proper body posture. Progressed resistance and repetitions as indicated. MODALITIES: (15 minutes): Interferential electrical stimulation with moist heat to low back in side lying x15 minutes to decrease pain. Skin clear afterwards.     Date:  02-21-19 Date:  02-26-19 Date:  02-28-19 Date  03-05-19 Date  03-07-19   Activity/Exercise Parameters Parameters Parameters     Active Hamstring with Ankle Pumps for Nerve Glide 3 reps  10 ankle pumps each 3 reps  10 ankle pumps each 3 reps  10 ankle pumps each 3 reps  10 ankle pumps each 3 reps  10 ankle pumps each   DKTC 3 reps  20 sec holds 3 reps  20 sec holds 3 reps  20 sec holds 3 reps  20 sec holds 3 reps  20 sec holds   Pelvic Tilts 15 reps  5 sec holds 15 reps  5 sec holds 15 reps  5 sec holds 15 reps  5 sec holds 15 reps  5 sec holds   Piriformis Stretch  3 reps  20 sec holds 3 reps  20 sec holds 3 reps  20 sec holds 3 reps  20 sec holds   Supine Marching  20 reps 20 reps 20 reps 20 reps   SLR Flexion with TA  10 reps  5 sec holds 10 reps  5 sec holds 10 reps  5 sec holds 10 reps  5 sec holds   Isometric Unilateral Hip Flexion  5 reps  10 sec holds 5 reps  10 sec holds 5 reps  10 sec holds 5 reps  10 sec holds   T-band Hip Abduction Green  20 reps      NuStep  Level 3  5 minutes      Wall Squats with Swiss Ball   15 reps  5 sec holds 15 reps  5 sec holds 15 reps  5 sec holds   T-band Rows and Straight Arm Pulldowns   Red  15 reps each Green  20 reps each Green  20 reps each   Bridging   10 reps  5 sec holds 10 reps  5 sec holds 10 reps  5 sec holds   Adams Pose   3 reps  15 sec holds 3 reps  15 sec holds        [employer] Portal  Treatment/Session Assessment:    · Response to Treatment:  Pt tolerated all treatments well today. She is to continue HEP. Discharge from PT.  · Compliance with Program/Exercises: Will assess as treatment progresses. · Recommendations/Intent for next treatment session: \"Next visit will focus on advancements to more challenging activities\".   Total Treatment Duration:  50  minutes  PT Patient Time In/Time Out  Time In: 1115  Time Out: [address], PT    Future Appointments   Date Time Provider Debby Douglass   3/25/2019  8:30 AM Eva Olivas MD BSN [facility]   3/27/2019 10:30 AM Loreda Lundborg, MD BSND [facility]

## 2019-05-30 ENCOUNTER — HOSPITAL ENCOUNTER (OUTPATIENT)
Dept: LAB | Age: 54
Discharge: HOME | End: 2019-05-30
Payer: COMMERCIAL

## 2019-05-30 DIAGNOSIS — G62.9 SMALL FIBER NEUROPATHY: ICD-10-CM

## 2019-05-30 LAB
COLLECTION COMMENT, COLCM: NORMAL
CRP SERPL-MCNC: <0.3 MG/DL (ref 0–0.9)
ERYTHROCYTE [SEDIMENTATION RATE] IN BLOOD: 12 MM/HR (ref 0–30)

## 2019-05-30 PROCEDURE — 86140 C-REACTIVE PROTEIN: CPT

## 2019-05-30 PROCEDURE — 85652 RBC SED RATE AUTOMATED: CPT

## 2019-05-30 PROCEDURE — 80074 ACUTE HEPATITIS PANEL: CPT

## 2019-05-30 PROCEDURE — 84165 PROTEIN E-PHORESIS SERUM: CPT

## 2019-05-30 PROCEDURE — 82595 ASSAY OF CRYOGLOBULIN: CPT

## 2019-05-30 PROCEDURE — 36415 COLL VENOUS BLD VENIPUNCTURE: CPT

## 2019-05-30 PROCEDURE — 83090 ASSAY OF HOMOCYSTEINE: CPT

## 2019-05-30 PROCEDURE — 86334 IMMUNOFIX E-PHORESIS SERUM: CPT

## 2019-05-31 LAB
ALBUMIN SERPL ELPH-MCNC: 4.34 G/DL (ref 3.2–5.6)
ALBUMIN/GLOB SERPL: 1.3 {RATIO}
ALPHA1 GLOB SERPL ELPH-MCNC: 0.22 G/DL (ref 0.1–0.4)
ALPHA2 GLOB SERPL ELPH-MCNC: 0.75 G/DL (ref 0.4–1.2)
B-GLOBULIN SERPL QL ELPH: 1.16 G/DL (ref 0.6–1.3)
GAMMA GLOB MFR SERPL ELPH: 1.23 G/DL (ref 0.5–1.6)
HAV IGM SERPL QL IA: NEGATIVE
HBV CORE IGM SERPL QL IA: NEGATIVE
HBV SURFACE AG SERPL QL IA: NEGATIVE
HCV AB S/CO SERPL IA: <0.1 S/CO RATIO (ref 0–0.9)
HCYS SERPL-SCNC: 8 UMOL/L (ref 0–15)
IGA SERPL-MCNC: 358 MG/DL (ref 85–499)
IGG SERPL-MCNC: 1021 MG/DL (ref 610–1616)
IGM SERPL-MCNC: 197 MG/DL (ref 35–242)
M PROTEIN SERPL ELPH-MCNC: NORMAL G/DL
PROT PATTERN SERPL ELPH-IMP: NORMAL
PROT PATTERN SPEC IFE-IMP: NORMAL
PROT SERPL-MCNC: 7.7 G/DL (ref 6.3–8.2)

## 2019-06-03 LAB — CRYOGLOB SER QL 1D COLD INC: NORMAL

## 2019-06-20 LAB
Lab: NORMAL
REFERENCE LAB,REFLB: NORMAL
TEST DESCRIPTION:,ATST: NORMAL

## 2021-04-20 NOTE — PROGRESS NOTES
Patient pre-assessment complete for [facility] scheduled for Tilt, arrival time 0830. Patient verified using . Patient instructed to bring all home medications in labeled bottles on the day of procedure. NPO status reinforced. Instructed they can take all other medications excluding vitamins & supplements. Patient verbalizes understanding of all instructions & denies any questions at this time.

## 2021-04-21 ENCOUNTER — HOSPITAL ENCOUNTER (OUTPATIENT)
Dept: NON INVASIVE DIAGNOSTICS | Age: 56
Discharge: HOME | End: 2021-04-21
Attending: INTERNAL MEDICINE | Admitting: INTERNAL MEDICINE
Payer: COMMERCIAL

## 2021-04-21 VITALS
WEIGHT: 179 LBS | HEART RATE: 66 BPM | SYSTOLIC BLOOD PRESSURE: 99 MMHG | DIASTOLIC BLOOD PRESSURE: 52 MMHG | HEIGHT: 68 IN | BODY MASS INDEX: 27.13 KG/M2 | OXYGEN SATURATION: 95 %

## 2021-04-21 DIAGNOSIS — G62.9 SMALL FIBER NEUROPATHY: ICD-10-CM

## 2021-04-21 LAB
ANION GAP SERPL CALC-SCNC: 3 MMOL/L (ref 7–16)
BUN SERPL-MCNC: 18 MG/DL (ref 6–23)
CALCIUM SERPL-MCNC: 9.2 MG/DL (ref 8.3–10.4)
CHLORIDE SERPL-SCNC: 106 MMOL/L (ref 98–107)
CO2 SERPL-SCNC: 30 MMOL/L (ref 21–32)
CREAT SERPL-MCNC: 0.88 MG/DL (ref 0.6–1)
ERYTHROCYTE [DISTWIDTH] IN BLOOD BY AUTOMATED COUNT: 14.9 % (ref 11.9–14.6)
GLUCOSE SERPL-MCNC: 90 MG/DL (ref 65–100)
HCT VFR BLD AUTO: 41.8 % (ref 35.8–46.3)
HGB BLD-MCNC: 13.2 G/DL (ref 11.7–15.4)
INR PPP: 0.9
MAGNESIUM SERPL-MCNC: 2.2 MG/DL (ref 1.8–2.4)
MCH RBC QN AUTO: 27.8 PG (ref 26.1–32.9)
MCHC RBC AUTO-ENTMCNC: 31.6 G/DL (ref 31.4–35)
MCV RBC AUTO: 88 FL (ref 79.6–97.8)
NRBC # BLD: 0 K/UL (ref 0–0.2)
PLATELET # BLD AUTO: 260 K/UL (ref 150–450)
PMV BLD AUTO: 9.7 FL (ref 9.4–12.3)
POTASSIUM SERPL-SCNC: 4.1 MMOL/L (ref 3.5–5.1)
PROTHROMBIN TIME: 12.8 SEC (ref 12.5–14.7)
RBC # BLD AUTO: 4.75 M/UL (ref 4.05–5.2)
SODIUM SERPL-SCNC: 139 MMOL/L (ref 136–145)
WBC # BLD AUTO: 5.7 K/UL (ref 4.3–11.1)

## 2021-04-21 PROCEDURE — 80048 BASIC METABOLIC PNL TOTAL CA: CPT

## 2021-04-21 PROCEDURE — 85610 PROTHROMBIN TIME: CPT

## 2021-04-21 PROCEDURE — 74011250637 HC RX REV CODE- 250/637: Performed by: INTERNAL MEDICINE

## 2021-04-21 PROCEDURE — 85027 COMPLETE CBC AUTOMATED: CPT

## 2021-04-21 PROCEDURE — 93660 TILT TABLE EVALUATION: CPT | Performed by: INTERNAL MEDICINE

## 2021-04-21 PROCEDURE — 93660 TILT TABLE EVALUATION: CPT

## 2021-04-21 PROCEDURE — 83735 ASSAY OF MAGNESIUM: CPT

## 2021-04-21 RX ORDER — NITROGLYCERIN 400 UG/1
1 SPRAY ORAL AS NEEDED
Status: DISCONTINUED | OUTPATIENT
Start: 2021-04-21 | End: 2021-04-21 | Stop reason: HOSPADM

## 2021-04-21 RX ADMIN — NITROGLYCERIN 1 SPRAY: 400 SPRAY ORAL at 09:53

## 2021-04-21 NOTE — PROGRESS NOTES
Tilt study completed. MD spoke with post procedure. Discharge instructions given. IV removed, tip intact. Patient ambulated out for discharge.

## 2021-04-21 NOTE — PROGRESS NOTES
Patient received to [address] [place] [address] room # 16  Ambulatory from [facility]. Patient scheduled for Tilt today with Dr Zofia Zuleta. Procedure reviewed & questions answered, voiced good understanding consent obtained & placed on chart. All medications and medical history reviewed. Will prep patient per orders. Patient & family updated on plan of care. The patient has a fraility score of 3-MANAGING WELL, based on ability to perform ADLS by self.

## 2021-04-21 NOTE — DISCHARGE INSTRUCTIONS
After your tilt table test    Be sure someone else drives you home. You have no restrictions:   Return to your previous diet. Return to your previous activities. Continue current medications       Call your doctor if:    · You have trouble breathing all of a sudden. · You have chest pain or any pain that spreads to your neck, jaw, or arms. · You have questions or concerns.   · You have a fever greater than 101°F.  · You have increase in dizzy or fainting spells    Doctor: [facility] [phone]

## 2021-04-22 NOTE — PROCEDURES
[facility]  CARDIAC CATH    Name:  Melinda Daley  MR#:  [id number]  :  1965  ACCOUNT #:  [de-identified]  DATE OF SERVICE:  2021    PROCEDURES PERFORMED:  This is an 80-degree head-up tilt table with nitroglycerin. PREOPERATIVE DIAGNOSES:  syncope    POSTOPERATIVE DIAGNOSES:  same    SURGEON:  Davidson Mccauley MD      START TIME:  9:28    STOP TIME:  10:15    TECHNIQUE:  Initial pulse 68, saturations 98%, blood pressure 101/62. With the patient in the recumbent position, she remained hemodynamically stable with a pulse of 61 and a blood pressure ranging from 98/59 to 104/58. After initial upright phase, she again remained hemodynamically stable with a heart rate of 67-70 beats per minute and a blood pressure of 110/63 and no symptoms. Nitroglycerin was administered and elicited no hemodynamic response. Pulse was 81, blood pressure was 115/65, and again, there were no symptoms. CONCLUSIONS:  Negative 80-degree head-up tilt table with no evidence of neurocardiogenic syncope or POT syndrome.     MD RACHEL [id number]  D:  2021 10:16  T:  2021 19:55  JOB #:  [id number]

## 2021-06-16 ENCOUNTER — HOSPITAL ENCOUNTER (OUTPATIENT)
Dept: LAB | Age: 56
Discharge: HOME | End: 2021-06-16

## 2021-06-16 PROCEDURE — 88312 SPECIAL STAINS GROUP 1: CPT

## 2021-06-16 PROCEDURE — 88305 TISSUE EXAM BY PATHOLOGIST: CPT

## 2022-03-20 PROBLEM — M43.17 SPONDYLOLISTHESIS OF LUMBOSACRAL REGION: Status: ACTIVE | Noted: 2019-02-13

## 2022-03-20 PROBLEM — G62.9 SMALL FIBER NEUROPATHY: Status: ACTIVE | Noted: 2019-02-13

## 2022-06-01 ENCOUNTER — HOSPITAL ENCOUNTER (OUTPATIENT)
Dept: GENERAL RADIOLOGY | Age: 57
Discharge: HOME | End: 2022-06-04
Payer: COMMERCIAL

## 2022-06-01 DIAGNOSIS — M54.9 DISCOMFORT OF BACK: ICD-10-CM

## 2022-06-01 PROCEDURE — 72202 X-RAY EXAM SI JOINTS 3/> VWS: CPT

## 2022-06-08 ENCOUNTER — TELEPHONE (OUTPATIENT)
Dept: ORTHOPEDIC SURGERY | Age: 57
End: 2022-06-08

## 2022-06-10 ENCOUNTER — TELEPHONE (OUTPATIENT)
Dept: ORTHOPEDIC SURGERY | Age: 57
End: 2022-06-10

## 2022-06-10 NOTE — TELEPHONE ENCOUNTER
Called patient to notify her that her appointment for 6/13 needs to be postponed until we resubmit a new request for injection. A voice mail was left stating this.

## 2022-06-14 DIAGNOSIS — M47.816 LUMBAR FACET ARTHROPATHY: Primary | ICD-10-CM

## 2022-06-21 ENCOUNTER — CLINICAL DOCUMENTATION (OUTPATIENT)
Dept: ORTHOPEDIC SURGERY | Age: 57
End: 2022-06-21

## 2022-06-21 NOTE — PROGRESS NOTES
JESSY Green 17 spoke with Dr. Dolores Calvin and she said to withdrawal the request and we are going to bring patient back in and start all over.    WILBERTO W/ MIKO [place] TO WITHDRAW THIS REQUEST

## 2022-06-21 NOTE — PROGRESS NOTES
RECEIVED DENIAL LTR FROM [facility], DR WHITAKER CAN DO p2p BUT IT SOUNDS LIKE TO ME IT'S JUST TO DISCUSS THE REASON FOR THE DENIAL NOT TO GET APPROVED. WE CAN APPEAL IF DR Kecia White WANTS TO DO A ADDENDUM NOTE?  NOTIFIED Camille Jane

## 2022-06-24 ENCOUNTER — TELEPHONE (OUTPATIENT)
Dept: ORTHOPEDIC SURGERY | Age: 57
End: 2022-06-24

## 2022-06-24 NOTE — TELEPHONE ENCOUNTER
Returned call to patient and she is needing a office visit due to her insurance company denying the Right side RFD.

## 2022-06-27 NOTE — PROGRESS NOTES
[facility]  Consultation Note    Patient ID:  Name: Jose Antonio Kim  MRN: [id number]  AGE: 62 y.o.  : 1965    Date of Consultation:  2022    CC:   Chief Complaint   Patient presents with    Back Pain         HPI:  Ms. Bhaskar Haynes is a 63-year-old female who presents today for follow-up of low back pain. She has pain in the lower back, right greater than left. The pain does not radiate to the legs or feet. There are no lower extremity paresthesias. The pain is aggravated with walking, standing, yard work. It is alleviated with sitting down or lying down. She underwent left L4-5-S1 lumbar facet joint radiofrequency denervation 2021. That has provided greater than 80% pain relief lasting greater than 7 months. She had greater than 80% pain relief of her usual low back pain on both sides following bilateral L4-5-S1 facet joint injections March 3, 2022. She also saw improvement in her functioning following these injections for greater than 4 weeks. She had greater than 80% pain relief of her usual right low back pain and improvement in her functioning for greater than 4 weeks following right L4-5-S1 facet joint injection 2021. The pain has now returned, worse on the right than the left. It is affecting her daily functioning. She has been performing physician guided home exercises daily since 2021 with little relief of her pain. She takes ibuprofen but cannot take that regularly because of GI upset. The pain of awakens her at night. She saw our surgical spine team, and they did not recommend surgical intervention for her problem, as she does not have significant radicular pain. MRI lumbar spine May 28, 2021 showed bilateral L5 pars defect with anterolisthesis L5-S1. Of note, she does have moderate right L5-S1 neuroforaminal narrowing but no significant radicular pain at this time.      Past Medical History Includes: Past Medical History:   Diagnosis Date    Thyroid disease    , No past surgical history on file. Family History: No family history on file. Social History:   Social History     Tobacco Use    Smoking status: Never Smoker    Smokeless tobacco: Never Used   Substance Use Topics    Alcohol use: No       ALLERGIES: Not on File     Patient Medications    Current Outpatient Medications   Medication Sig    celecoxib (CELEBREX) 200 MG capsule Take 1 capsule by mouth daily    ALPRAZolam (XANAX) 0.5 MG tablet Take one tablet 45 minutes prior to procedure Indications: anxious    Cetirizine HCl (ZYRTEC ALLERGY) 10 MG TBDP 1 tablet    Cholecalciferol 100 MCG (4000 UT) CAPS Take by mouth    clonazePAM (KLONOPIN) 0.5 MG tablet 1 tablet at bedtime    cyanocobalamin 1000 MCG/ML injection Inject 1,000 mcg into the muscle every 30 days    diazePAM (VALIUM) 10 MG tablet Take one tablet by mouth one hour prior to procedure.  FLUoxetine HCl, PMDD, 20 MG TABS Take 20 mg by mouth daily    levothyroxine (SYNTHROID) 88 MCG tablet 1 tablet on an empty stomach in the morning    magnesium oxide (MAG-OX) 400 (240 Mg) MG tablet Take by mouth daily    Pancrelipase, Lip-Prot-Amyl, (PERTZYE) 33578-12671 units CPEP TAKE THREE CAPSULES BY MOUTH WITH EACH MEAL AND 1 TO 2 CAPSULES WITH EACH SNACK UP TO 13 CAPSULES DAILY    pantoprazole sodium (PROTONIX) 40 MG PACK packet 1 tablet     No current facility-administered medications for this visit. ROS/Meds/PSH/PMH/FH/SH: I personally reviewed the patients standard intake form. Physical Exam:      Lumbar: PE: General: Awake, alert, no distress. HEENT: Mucous membranes moist and pink. Bula Dayhoff Psych: Appropriate and conversant. Derm: No rash Gait: Unassisted, non-ataxic MS: Straight leg raise negative bilaterally. No pain with hip internal or external rotation. No pain with resisted hip flexion bilaterally. No pain with lumbar flexion . She has pain with lumbar extension.   She has ipsilateral low back pain with bilateral lumbar facet load, R>L. Non-tender lumbar spine and paraspinals. Strength is 5/5 and symmetric in the bilateral lower extremities. No foot drop. Neurological: Sensation to light touch is intact in the bilateral lower extremities. Reflexes are 2+ and symmetric in the bilateral lower extremities. VITALS: @IPVITALS(8:)@ , [id number]       No flowsheet data found. No results found for any visits on 06/28/22. Assessment and Plan:     ICD-10-CM    1. Lumbar spondylosis  M47.816    2. Disc degeneration, lumbar  M51.36    3. Acquired spondylolisthesis  M43.10        No orders of the defined types were placed in this encounter. 4   This is a chronic illness/condition with exacerbation and progression  Treatment at this time: Prescription Drug Management  Discussed Injection therapy at length today, including risks, complications and alternative treatment options. The patient wishes to proceed. The injection will be performed as right L4, L5, and S1 medial branch nerve blocks. The plan will be that if she has greater than 80% pain relief from these, she will benefit from right lumbar facet radiofrequency ablation.   Studies ordered today: none      Brunilda Ma MD  6/28/2022,  5:23 PM

## 2022-06-28 ENCOUNTER — OFFICE VISIT (OUTPATIENT)
Dept: ORTHOPEDIC SURGERY | Age: 57
End: 2022-06-28
Payer: COMMERCIAL

## 2022-06-28 VITALS — WEIGHT: 176 LBS | BODY MASS INDEX: 26.67 KG/M2 | HEIGHT: 68 IN

## 2022-06-28 DIAGNOSIS — M47.816 LUMBAR SPONDYLOSIS: Primary | ICD-10-CM

## 2022-06-28 DIAGNOSIS — M51.36 DISC DEGENERATION, LUMBAR: ICD-10-CM

## 2022-06-28 DIAGNOSIS — M43.10 ACQUIRED SPONDYLOLISTHESIS: ICD-10-CM

## 2022-06-28 PROCEDURE — 99214 OFFICE O/P EST MOD 30 MIN: CPT | Performed by: PHYSICAL MEDICINE & REHABILITATION

## 2022-06-28 RX ORDER — CELECOXIB 200 MG/1
200 CAPSULE ORAL DAILY
Qty: 30 CAPSULE | Refills: 2 | Status: SHIPPED | OUTPATIENT
Start: 2022-06-28

## 2022-06-28 NOTE — LETTER
Jansen Juan Diego  1965  ______________________________________________________________________    Radiographic Studies:    Cervical MRI/ Contrast        Thoracic MRI/ Contrast        Lumbar MRI/ Contrast    CT Myelogram __________________________________________________    NCS/EMG______________________________________________________    MRI of ________________________________________________________    Other__________________________________________________________      Injections:    _______________________________________________________________    Authorization to stop blood thinners________________________________      Medications:    Oral steroids___________________    Muscle Relaxers___________________    Pain medications_____________________    NSAIDS_____________________    Neuropathic pain medication_________________________________________      Physical Therapy:    Lumbar     Thoracic     Cervical       Other_______________________________      Follow up/ Referral:    Pain referral_______________________________________________________    Referral___________________________________________________________    Follow up_________________________________________________________    Handicapped Parking_______________________________________________    Other_____________________________________________________________

## 2022-06-28 NOTE — LETTER
Lumbar Spine Injection Precert Authorization Form    Name: Chip Doss  : 1965  Age: 62 y.o. Gender: female    Clinical Information    Referring Doctor: Moiz Belle. Diagnosis:     ICD-10-CM    1. Lumbar spondylosis  M47.816    2. Disc degeneration, lumbar  M51.36    3. Acquired spondylolisthesis  M43.10    . Body Part:    Type of Service    [ ] 17547+- JESICA Caudal or Lumbar    [ x] 10811- Facet Lumbar (single Level)    [ x] 72885- Facet 2nd Level    [ ] 05275- Facet 3 or more level    [ ] 35972- Sacroiliac joint     [ ] 50374- SNRB Transforaminal JESICA Lumbar or Sacral (single level)    [ ] 25868- SNRB add levels Lumbar or Sacral     [ ] 78077- Sciatic Nerve, single. [ ] 77639- Radiofrequency L/S single facet     [ ] 97621- Radiofrequency L/s additional facet       Comments  Right L4-5-S1 MBB injection.      Insurance:    1st Payor  Payor: [facility] / Plan: [facility] / Product Type: *No Product type* /      Secondary Payor if applicable  [unfilled]

## 2022-06-29 ENCOUNTER — CLINICAL DOCUMENTATION (OUTPATIENT)
Dept: ORTHOPEDIC SURGERY | Age: 57
End: 2022-06-29

## 2022-07-11 ENCOUNTER — OFFICE VISIT (OUTPATIENT)
Dept: ORTHOPEDIC SURGERY | Age: 57
End: 2022-07-11
Payer: COMMERCIAL

## 2022-07-11 DIAGNOSIS — M47.816 LUMBAR FACET ARTHROPATHY: ICD-10-CM

## 2022-07-11 DIAGNOSIS — M47.816 LUMBAR SPONDYLOSIS: Primary | ICD-10-CM

## 2022-07-11 DIAGNOSIS — M43.10 ACQUIRED SPONDYLOLISTHESIS: ICD-10-CM

## 2022-07-11 DIAGNOSIS — M51.36 DISC DEGENERATION, LUMBAR: ICD-10-CM

## 2022-07-11 PROCEDURE — 64493 INJ PARAVERT F JNT L/S 1 LEV: CPT | Performed by: PHYSICAL MEDICINE & REHABILITATION

## 2022-07-11 PROCEDURE — 64494 INJ PARAVERT F JNT L/S 2 LEV: CPT | Performed by: PHYSICAL MEDICINE & REHABILITATION

## 2022-07-26 ENCOUNTER — PATIENT MESSAGE (OUTPATIENT)
Dept: ORTHOPEDIC SURGERY | Age: 57
End: 2022-07-26

## 2022-07-26 DIAGNOSIS — M47.816 LUMBAR FACET ARTHROPATHY: ICD-10-CM

## 2022-07-26 DIAGNOSIS — M51.36 DISC DEGENERATION, LUMBAR: ICD-10-CM

## 2022-07-26 DIAGNOSIS — M43.10 ACQUIRED SPONDYLOLISTHESIS: ICD-10-CM

## 2022-07-26 DIAGNOSIS — M47.816 LUMBAR SPONDYLOSIS: Primary | ICD-10-CM

## 2022-08-01 NOTE — TELEPHONE ENCOUNTER
From: Alexis Santiago  [facility] 7/26/2022 2:25 PM EDT  Subject: Schedule ablation     Joy Nevarez- the last injections did help a good bit (at least 80%) can please have then schedule ablation.   Thank you,  Luis

## 2022-08-01 NOTE — TELEPHONE ENCOUNTER
Spoke with patient through phone call and she was scheduled for a right side RFD with Dr. Silas Olmedo.

## 2022-09-12 ENCOUNTER — OFFICE VISIT (OUTPATIENT)
Dept: ORTHOPEDIC SURGERY | Age: 57
End: 2022-09-12
Payer: COMMERCIAL

## 2022-09-12 DIAGNOSIS — M47.816 LUMBAR FACET ARTHROPATHY: Primary | ICD-10-CM

## 2022-09-12 PROCEDURE — 64636 DESTROY L/S FACET JNT ADDL: CPT | Performed by: PHYSICAL MEDICINE & REHABILITATION

## 2022-09-12 PROCEDURE — 64635 DESTROY LUMB/SAC FACET JNT: CPT | Performed by: PHYSICAL MEDICINE & REHABILITATION

## 2022-09-12 RX ORDER — DIAZEPAM 10 MG/1
TABLET ORAL
Qty: 1 TABLET | Refills: 0 | Status: SHIPPED | OUTPATIENT
Start: 2022-09-12 | End: 2022-10-12

## 2022-09-12 RX ORDER — TRIAMCINOLONE ACETONIDE 40 MG/ML
40 INJECTION, SUSPENSION INTRA-ARTICULAR; INTRAMUSCULAR ONCE
Status: COMPLETED | OUTPATIENT
Start: 2022-09-12 | End: 2022-09-12

## 2022-09-12 RX ADMIN — TRIAMCINOLONE ACETONIDE 40 MG: 40 INJECTION, SUSPENSION INTRA-ARTICULAR; INTRAMUSCULAR at 13:37

## 2022-09-12 NOTE — PROGRESS NOTES
Name: Azam Fowler  YOB: 1965  Gender: female  MRN: [id number]    PROCEDURE: Right  L4, L5, and S1 medial branch nerve/facet joint radiofrequency denervation     Precautions:  Azam Fowler denies prior sensitivity to steroid, local anesthetic, iodine, or shellfish. The procedure was discussed at length with the patient and informed consent was signed and placed in the chart. She had great relief of her usual low back pain from previous L4-L5 and L5-S1 bilateral  facet joint injections. The patient was placed in a prone position on the fluoroscopy table and the skin was prepped and draped in a routine sterile fashion with Hibaclens. The area where the medial branch nerve lies for Right L4 was identified under fluoroscopy, and the area to be injected was anesthetized with 1 mL of 1% Lidocaine. A 20 gauge 10 cm Neurotherm radiofrequency needle was carefully advanced under fluoroscopic guidance to area where the medial branch nerve for Right L4 lies. Bony contact was made. Testing for motor and sensory was negative into the lower extremities and groin to 3 volts. Motor onset was 0.4 volts. Aspiration was negative. An injectate of 1 mL of 0.25% marcaine was injected. Lesioning was carried out for 60 seconds at 80 degrees celsius. The probe was then rotated 180 degrees, and lesioning was repeated. The above procedure was repeated at the levels of the Right  L5 and S1 medial branch nerves. At the level of Right  L5 medial branch nerve, motor onset was 0.5 volts. Testing for motor and sensory was negative into the lower extremities and groin to 3.0 volts at Right L5 medial branch nerve. At the level of Right S1 medial branch nerve, motor onset was 0.5 volts. Testing for motor and sensory was negative into the lower extremities and groin to 3.0 volts at Right  S1 medial branch nerve.     Following lesioning of all 3 nerves, an injectate of 10ml Marcaine

## 2022-10-13 ENCOUNTER — TELEPHONE (OUTPATIENT)
Dept: ORTHOPEDIC SURGERY | Age: 57
End: 2022-10-13

## 2022-10-17 ENCOUNTER — CLINICAL DOCUMENTATION (OUTPATIENT)
Dept: ORTHOPEDIC SURGERY | Age: 57
End: 2022-10-17

## 2022-10-18 ENCOUNTER — CLINICAL DOCUMENTATION (OUTPATIENT)
Dept: ORTHOPEDIC SURGERY | Age: 57
End: 2022-10-18

## 2022-10-18 NOTE — PROGRESS NOTES
FAXED [facility] [place] FORM, REQ, CLINICALS AND MRI REPORT TO # [phone]

## 2022-10-21 ENCOUNTER — PATIENT MESSAGE (OUTPATIENT)
Dept: ORTHOPEDIC SURGERY | Age: 57
End: 2022-10-21

## 2022-10-21 DIAGNOSIS — M47.816 LUMBAR SPONDYLOSIS: Primary | ICD-10-CM

## 2022-10-21 RX ORDER — DIAZEPAM 10 MG/1
TABLET ORAL
Qty: 1 TABLET | Refills: 0 | Status: SHIPPED | OUTPATIENT
Start: 2022-10-21 | End: 2022-10-31

## 2022-10-21 NOTE — TELEPHONE ENCOUNTER
From: Kayli Jade  To: Dr. Harish Kirk: 10/21/2022 9:57 AM EDT  Subject: Valium     Lucy Nava I have not heard anything from [facility] about the Valium for [facility] procedure.  ThanksLuis

## 2022-10-24 ENCOUNTER — OFFICE VISIT (OUTPATIENT)
Dept: ORTHOPEDIC SURGERY | Age: 57
End: 2022-10-24
Payer: COMMERCIAL

## 2022-10-24 DIAGNOSIS — M51.36 DISC DEGENERATION, LUMBAR: ICD-10-CM

## 2022-10-24 DIAGNOSIS — M47.816 LUMBAR FACET ARTHROPATHY: ICD-10-CM

## 2022-10-24 DIAGNOSIS — M47.816 LUMBAR SPONDYLOSIS: Primary | ICD-10-CM

## 2022-10-24 PROCEDURE — 64635 DESTROY LUMB/SAC FACET JNT: CPT | Performed by: PHYSICAL MEDICINE & REHABILITATION

## 2022-10-24 PROCEDURE — 64636 DESTROY L/S FACET JNT ADDL: CPT | Performed by: PHYSICAL MEDICINE & REHABILITATION

## 2022-10-24 RX ORDER — TRIAMCINOLONE ACETONIDE 40 MG/ML
20 INJECTION, SUSPENSION INTRA-ARTICULAR; INTRAMUSCULAR ONCE
Status: COMPLETED | OUTPATIENT
Start: 2022-10-24 | End: 2022-10-24

## 2022-10-24 RX ADMIN — TRIAMCINOLONE ACETONIDE 20 MG: 40 INJECTION, SUSPENSION INTRA-ARTICULAR; INTRAMUSCULAR at 14:13

## 2022-10-24 NOTE — PROGRESS NOTES
Name: Carl Jordan  YOB: 1965  Gender: female  MRN: [id number]    PROCEDURE: Left  L4, L5, and S1 medial branch nerve/facet joint radiofrequency denervation     Precautions:  Carl Jordan denies prior sensitivity to steroid, local anesthetic, iodine, or shellfish. The procedure was discussed at length with the patient and informed consent was signed and placed in the chart. She had great relief of her usual low back pain from previous L4-L5 and L5-S1 bilateral  facet joint injections. The patient was placed in a prone position on the fluoroscopy table and the skin was prepped and draped in a routine sterile fashion with Hibaclens. The area where the medial branch nerve lies for Left L4 was identified under fluoroscopy, and the area to be injected was anesthetized with 1 mL of 1% Lidocaine. A 20 gauge 10 cm Neurotherm radiofrequency needle was carefully advanced under fluoroscopic guidance to area where the medial branch nerve for Left L4 lies. Bony contact was made. Testing for motor and sensory was negative into the lower extremities and groin to 3 volts. Motor onset was 0.5 volts. Aspiration was negative. An injectate of 1 mL of 0.25% marcaine was injected. Lesioning was carried out for 60 seconds at 80 degrees celsius. The probe was then rotated 180 degrees, and lesioning was repeated. The above procedure was repeated at the levels of the Left  L5 and S1 medial branch nerves. At the level of Left  L5 medial branch nerve, motor onset was 0.5 volts. Testing for motor and sensory was negative into the lower extremities and groin to 3.0 volts at Left L5 medial branch nerve. At the level of Left S1 medial branch nerve, motor onset was 0.6 volts. Testing for motor and sensory was negative into the lower extremities and groin to 3.0 volts at Left  S1 medial branch nerve.     Following lesioning of all 3 nerves, an injectate of 10ml Marcaine 0.25% and 10 mg of kenalog was injected at each level. She tolerated the procedure well. There were no complications. She was stable and ambulatory at discharge. Fluoroscopic guidance was used intermittently over a 50-minute period to insure proper needle placement and patient safety. Procedural Diagnosis: @    ICD-10-CM    1. Lumbar spondylosis  M47.816 XR DESTR PARAVERTBRL LUMB/SAC ADD LV W S&I     XR DESTR PARAVERTBRL LUMB/SAC W S&I     triamcinolone acetonide (KENALOG-40) injection 20 mg      2. Lumbar facet arthropathy  M47.816 XR DESTR PARAVERTBRL LUMB/SAC ADD LV W S&I     XR DESTR PARAVERTBRL LUMB/SAC W S&I     triamcinolone acetonide (KENALOG-40) injection 20 mg      3.  Disc degeneration, lumbar  M51.36 XR DESTR PARAVERTBRL LUMB/SAC ADD LV W S&I     XR DESTR PARAVERTBRL LUMB/SAC W S&I     triamcinolone acetonide (KENALOG-40) injection 20 mg          Eb Sharma MD

## 2022-12-09 ENCOUNTER — TELEPHONE (OUTPATIENT)
Dept: ORTHOPEDIC SURGERY | Age: 57
End: 2022-12-09

## 2022-12-12 ENCOUNTER — CLINICAL DOCUMENTATION (OUTPATIENT)
Dept: ORTHOPEDIC SURGERY | Age: 57
End: 2022-12-12

## 2022-12-12 NOTE — PROGRESS NOTES
FAXED [employer] [place] FORM, REQ, [address] [facility],[address] AND MRI REPORT, WB[facility]#[phone]

## 2022-12-19 ENCOUNTER — OFFICE VISIT (OUTPATIENT)
Dept: ORTHOPEDIC SURGERY | Age: 57
End: 2022-12-19

## 2022-12-19 DIAGNOSIS — M51.36 DISC DEGENERATION, LUMBAR: ICD-10-CM

## 2022-12-19 DIAGNOSIS — M47.816 LUMBAR FACET ARTHROPATHY: ICD-10-CM

## 2022-12-19 DIAGNOSIS — M47.816 LUMBAR SPONDYLOSIS: Primary | ICD-10-CM

## 2022-12-19 RX ORDER — METHYLPREDNISOLONE ACETATE 80 MG/ML
80 INJECTION, SUSPENSION INTRA-ARTICULAR; INTRALESIONAL; INTRAMUSCULAR; SOFT TISSUE ONCE
Status: COMPLETED | OUTPATIENT
Start: 2022-12-19 | End: 2022-12-19

## 2022-12-19 RX ADMIN — METHYLPREDNISOLONE ACETATE 80 MG: 80 INJECTION, SUSPENSION INTRA-ARTICULAR; INTRALESIONAL; INTRAMUSCULAR; SOFT TISSUE at 14:12

## 2022-12-19 NOTE — PROGRESS NOTES
Name: Ender Pradhan  YOB: 1965  Gender: female  MRN: [id number]    Procedure: Bilateral  L4-L5 and L5-S1 facet joint injections     Precautions: Ender Pradhan deniesprior sensitivity to steroid, local anesthetic, iodine, or shellfish. The procedure was discussed at length with her and informed consent was signed and placed in the chart. She was placed in a prone position on the fluoroscopy table and the skin was prepped and draped in a routine sterile fashion. The areas to be injected were each anesthetized with 1% lidocaine. Next, a 25-gauge 3.5 inch spinal needle was carefully advanced under fluoroscopic guidance to the rightL4 - L5 facet joint. Aspiration was negative. Once proper placement was confirmed, 1 ml of 0.25% Marcaine and  0.25 mL of 80 mg/mL depomedrol were injected through the spinal needle. The above procedure was then repeated through the rightL5 - S1, and left L4 - L5, and leftL5 - S1 facet joints. Fluoroscopic guidance was used intermittently over a 10-minute period to insure proper needle placement and her safety. A hard copy of the fluoroscopic images has been placed in her chart and is saved on the C-arm hard drive. She was monitored for 30 minutes after the procedure and discharged home in a stable fashion with a routine follow up. Procedural Diagnosis:     ICD-10-CM    1. Lumbar spondylosis  M47.816 FL INJ LUMB/SAC FACET SINGLE LEVEL     XR INJ FACET LUMB SACRAL 2ND LVL     methylPREDNISolone acetate (DEPO-MEDROL) injection 80 mg      2. Lumbar facet arthropathy  M47.816 FL INJ LUMB/SAC FACET SINGLE LEVEL     XR INJ FACET LUMB SACRAL 2ND LVL     methylPREDNISolone acetate (DEPO-MEDROL) injection 80 mg      3.  Disc degeneration, lumbar  M51.36 FL INJ LUMB/SAC FACET SINGLE LEVEL     XR INJ FACET LUMB SACRAL 2ND LVL     methylPREDNISolone acetate (DEPO-MEDROL) injection 80 mg           Maru Miller MD  12/19/22

## 2023-02-23 ENCOUNTER — TELEPHONE (OUTPATIENT)
Dept: ORTHOPEDIC SURGERY | Age: 58
End: 2023-02-23

## 2023-02-23 DIAGNOSIS — M47.816 LUMBAR SPONDYLOSIS: Primary | ICD-10-CM

## 2023-02-23 DIAGNOSIS — M51.36 DISC DEGENERATION, LUMBAR: ICD-10-CM

## 2023-02-23 DIAGNOSIS — M47.816 LUMBAR FACET ARTHROPATHY: ICD-10-CM

## 2023-03-01 ENCOUNTER — OFFICE VISIT (OUTPATIENT)
Dept: ORTHOPEDIC SURGERY | Age: 58
End: 2023-03-01

## 2023-03-01 DIAGNOSIS — M51.36 DISC DEGENERATION, LUMBAR: ICD-10-CM

## 2023-03-01 DIAGNOSIS — M47.816 LUMBAR SPONDYLOSIS: Primary | ICD-10-CM

## 2023-03-01 DIAGNOSIS — M47.816 LUMBAR FACET ARTHROPATHY: ICD-10-CM

## 2023-03-01 RX ORDER — TRIAMCINOLONE ACETONIDE 40 MG/ML
40 INJECTION, SUSPENSION INTRA-ARTICULAR; INTRAMUSCULAR ONCE
Status: COMPLETED | OUTPATIENT
Start: 2023-03-01 | End: 2023-03-01

## 2023-03-01 RX ADMIN — TRIAMCINOLONE ACETONIDE 40 MG: 40 INJECTION, SUSPENSION INTRA-ARTICULAR; INTRAMUSCULAR at 14:33

## 2023-03-01 NOTE — PROGRESS NOTES
Name: José Manuel Tan  YOB: 1965  Gender: female  MRN: [id number]        Transforaminal JESICA Procedure Note    Procedure: Bilateral  L5-S1 transforaminal epidural steroid injections     Precautions: José Manuel Tan denies prior sensitivity to steroid, local anesthetic, iodine, or shellfish. Consent:  Consent was obtained prior to the procedure. The procedure was discussed at length with José Manuel Tan. She was given the opportunity to ask questions regarding the procedure and its associated risks. In addition to the potential risks associated with the procedure itself, the patient was informed both verbally and in writing of potential side effects of the use glucocorticoids. The patient appeared to comprehend the informed consent and desired to have the procedure performed, and informed consent was signed. She was placed in a prone position on the fluoroscopy table and the skin was prepped and draped in a routine sterile fashion. The areas to be injected were each anesthetized with 1 ml of 1% Lidocaine. A 22 gauge 3.5 inch spinal needle was carefully advanced under fluoroscopic guidance to the right L5-S1 transforaminal space  0.5 ml of 70% of Omnipaque was injected to confirm proper needle placement and absence of subdural or vascular flow Once proper placement was confirmed, 0.5ml of 0.25 marcaine and 40 mg kenalog were injected through the spinal needle. The above procedure was then repeated at the Left  L5-S1 transforaminal space. Fluoroscopic guidance was used intermittently over a 10-minute period to insure proper needle placement and her safety. A hard copy of the fluoroscopic image has been placed in her chart and is saved on the C-arm hard drive. She was monitored for 30 minutes after the procedure and discharged home in a stable fashion with a routine follow up.     Procedural Diagnosis:     ICD-10-CM    1. Lumbar spondylosis  M47.816 FL NERVE BLOCK LUMBOSACRAL 1ST     triamcinolone acetonide (KENALOG-40) injection 40 mg      2. Lumbar facet arthropathy  M47.816 FL NERVE BLOCK LUMBOSACRAL 1ST     triamcinolone acetonide (KENALOG-40) injection 40 mg      3.  Disc degeneration, lumbar  M51.36 FL NERVE BLOCK LUMBOSACRAL 1ST     triamcinolone acetonide (KENALOG-40) injection 40 mg         Yanet Ibarra MD  03/01/23

## 2023-03-16 ENCOUNTER — APPOINTMENT (RX ONLY)
Dept: URBAN - METROPOLITAN AREA CLINIC 330 | Facility: CLINIC | Age: 58
Setting detail: DERMATOLOGY
End: 2023-03-16

## 2023-03-16 DIAGNOSIS — L57.8 OTHER SKIN CHANGES DUE TO CHRONIC EXPOSURE TO NONIONIZING RADIATION: ICD-10-CM

## 2023-03-16 DIAGNOSIS — L57.0 ACTINIC KERATOSIS: ICD-10-CM

## 2023-03-16 DIAGNOSIS — D22 MELANOCYTIC NEVI: ICD-10-CM

## 2023-03-16 DIAGNOSIS — Z71.89 OTHER SPECIFIED COUNSELING: ICD-10-CM

## 2023-03-16 DIAGNOSIS — L82.1 OTHER SEBORRHEIC KERATOSIS: ICD-10-CM

## 2023-03-16 DIAGNOSIS — L82.0 INFLAMED SEBORRHEIC KERATOSIS: ICD-10-CM

## 2023-03-16 PROBLEM — D22.5 MELANOCYTIC NEVI OF TRUNK: Status: ACTIVE | Noted: 2023-03-16

## 2023-03-16 PROCEDURE — ? TREATMENT REGIMEN

## 2023-03-16 PROCEDURE — 99203 OFFICE O/P NEW LOW 30 MIN: CPT | Mod: 25

## 2023-03-16 PROCEDURE — 17110 DESTRUCTION B9 LES UP TO 14: CPT

## 2023-03-16 PROCEDURE — ? OTHER

## 2023-03-16 PROCEDURE — ? FULL BODY SKIN EXAM

## 2023-03-16 PROCEDURE — ? LIQUID NITROGEN

## 2023-03-16 PROCEDURE — ? COUNSELING

## 2023-03-16 PROCEDURE — 17000 DESTRUCT PREMALG LESION: CPT | Mod: 59

## 2023-03-16 PROCEDURE — ? EDUCATIONAL RESOURCES PROVIDED

## 2023-03-16 PROCEDURE — ? BODY PHOTOGRAPHY

## 2023-03-16 ASSESSMENT — LOCATION DETAILED DESCRIPTION DERM
LOCATION DETAILED: LEFT LATERAL PROXIMAL CALF
LOCATION DETAILED: RIGHT MEDIAL PROXIMAL PRETIBIAL REGION
LOCATION DETAILED: LEFT BUTTOCK
LOCATION DETAILED: LEFT MEDIAL UPPER BACK
LOCATION DETAILED: SUPERIOR THORACIC SPINE
LOCATION DETAILED: INFERIOR THORACIC SPINE
LOCATION DETAILED: RIGHT SUPERIOR MEDIAL MALAR CHEEK
LOCATION DETAILED: LEFT NASAL DORSUM
LOCATION DETAILED: RIGHT MEDIAL KNEE

## 2023-03-16 ASSESSMENT — LOCATION SIMPLE DESCRIPTION DERM
LOCATION SIMPLE: RIGHT PRETIBIAL REGION
LOCATION SIMPLE: LEFT BUTTOCK
LOCATION SIMPLE: NOSE
LOCATION SIMPLE: LEFT UPPER BACK
LOCATION SIMPLE: LEFT LOWER LEG
LOCATION SIMPLE: RIGHT KNEE
LOCATION SIMPLE: RIGHT CHEEK
LOCATION SIMPLE: UPPER BACK

## 2023-03-16 ASSESSMENT — LOCATION ZONE DERM
LOCATION ZONE: TRUNK
LOCATION ZONE: NOSE
LOCATION ZONE: LEG
LOCATION ZONE: FACE

## 2023-03-16 NOTE — PROCEDURE: LIQUID NITROGEN
Number Of Freeze-Thaw Cycles: 3 freeze-thaw cycles
Render Post-Care Instructions In Note?: no
Show Aperture Variable?: Yes
Consent: The patient's consent was obtained including but not limited to risks of crusting, scabbing, blistering, scarring, darker or lighter pigmentary change, recurrence, incomplete removal and infection.
Detail Level: Detailed
Duration Of Freeze Thaw-Cycle (Seconds): 2
Post-Care Instructions: I reviewed with the patient in detail post-care instructions. Patient is to wear sunprotection, and avoid picking at any of the treated lesions. Pt may apply Vaseline to crusted or scabbing areas.
Medical Necessity Clause: This procedure was medically necessary because the lesions that were treated were:
Medical Necessity Information: It is in your best interest to select a reason for this procedure from the list below. All of these items fulfill various CMS LCD requirements except the new and changing color options.
Spray Paint Text: The liquid nitrogen was applied to the skin utilizing a spray paint frosting technique.

## 2023-03-16 NOTE — PROCEDURE: BODY PHOTOGRAPHY
Whole Body Statement: The whole body was photographed today.
Number Of Photographs (Optional- Will Not Render If 0): 1
Detail Level: Detailed
Reason For Photography: The patient is obtaining whole body photography to observe existing suspicious moles and or monitor for the appearance of any new lesions.
Was The Entire Body Photographed (Cannot Bill Unless Entire Body Photographed)?: Please Select Yes or No - If you select Yes you are confirming that you took full body photographs
Consent was obtained for whole body photography. Patient understands that photograph costs may not be covered by insurance.

## 2023-03-16 NOTE — PROCEDURE: OTHER
Other (Free Text): Patient consent was obtained to proceed with the visit and recommended plan of care after discussion of all risks and benefits, including the risks of COVID-19 exposure.
Render Risk Assessment In Note?: yes
Note Text (......Xxx Chief Complaint.): This diagnosis correlates with the
Detail Level: Generalized

## 2023-05-05 ENCOUNTER — TELEPHONE (OUTPATIENT)
Dept: ORTHOPEDIC SURGERY | Age: 58
End: 2023-05-05

## 2023-05-05 DIAGNOSIS — M47.816 LUMBAR SPONDYLOSIS: Primary | ICD-10-CM

## 2023-05-05 DIAGNOSIS — M51.36 DISC DEGENERATION, LUMBAR: ICD-10-CM

## 2023-05-05 DIAGNOSIS — M47.816 LUMBAR FACET ARTHROPATHY: ICD-10-CM

## 2023-05-05 NOTE — TELEPHONE ENCOUNTER
Call returned to patient and she would like to go ahead and schedule a RFD with Dr. Steven Lassiter again. Appointment has been scheduled.

## 2023-05-08 ENCOUNTER — CLINICAL DOCUMENTATION (OUTPATIENT)
Dept: ORTHOPEDIC SURGERY | Age: 58
End: 2023-05-08

## 2023-05-08 NOTE — PROGRESS NOTES
[facility] REF#[id number]  PRE-CERT DONE ONLINE  REQ CLINICALS AND MRI REPORT WERE FAXED [phone]

## 2023-05-10 ENCOUNTER — CLINICAL DOCUMENTATION (OUTPATIENT)
Dept: ORTHOPEDIC SURGERY | Age: 58
End: 2023-05-10

## 2023-05-12 ENCOUNTER — PATIENT MESSAGE (OUTPATIENT)
Dept: ORTHOPEDIC SURGERY | Age: 58
End: 2023-05-12

## 2023-05-12 DIAGNOSIS — M47.816 LUMBAR FACET ARTHROPATHY: Primary | ICD-10-CM

## 2023-05-15 RX ORDER — DIAZEPAM 10 MG/1
TABLET ORAL
Qty: 2 TABLET | Refills: 0 | Status: SHIPPED | OUTPATIENT
Start: 2023-05-15 | End: 2023-05-31

## 2023-05-18 ENCOUNTER — OFFICE VISIT (OUTPATIENT)
Dept: ORTHOPEDIC SURGERY | Age: 58
End: 2023-05-18

## 2023-05-18 DIAGNOSIS — M47.816 LUMBAR SPONDYLOSIS: ICD-10-CM

## 2023-05-18 DIAGNOSIS — M51.36 DISC DEGENERATION, LUMBAR: ICD-10-CM

## 2023-05-18 DIAGNOSIS — M47.816 LUMBAR FACET ARTHROPATHY: Primary | ICD-10-CM

## 2023-05-18 RX ORDER — TRIAMCINOLONE ACETONIDE 40 MG/ML
40 INJECTION, SUSPENSION INTRA-ARTICULAR; INTRAMUSCULAR ONCE
Status: COMPLETED | OUTPATIENT
Start: 2023-05-18 | End: 2023-05-18

## 2023-05-18 RX ADMIN — TRIAMCINOLONE ACETONIDE 40 MG: 40 INJECTION, SUSPENSION INTRA-ARTICULAR; INTRAMUSCULAR at 15:51

## 2023-05-18 NOTE — PROGRESS NOTES
Name: Stephanie Conroy  YOB: 1965  Gender: female  MRN: [id number]    PROCEDURE: Right  L4, L5, and S1 medial branch nerve/facet joint radiofrequency denervation     Precautions:  Stephanie Conroy denies prior sensitivity to steroid, local anesthetic, iodine, or shellfish. The procedure was discussed at length with the patient and informed consent was signed and placed in the chart. She had great relief of her usual low back pain from previous L4-L5 and L5-S1 bilateral  facet joint injections. The patient was placed in a prone position on the fluoroscopy table and the skin was prepped and draped in a routine sterile fashion with Hibaclens. The area where the medial branch nerve lies for Right L4 was identified under fluoroscopy, and the area to be injected was anesthetized with 1 mL of 1% Lidocaine. A 20 gauge 10 cm Neurotherm radiofrequency needle was carefully advanced under fluoroscopic guidance to area where the medial branch nerve for Right L4 lies. Bony contact was made. Testing for motor and sensory was negative into the lower extremities and groin to 3 volts. Motor onset was 0.4 volts. Aspiration was negative. An injectate of 1 mL of 0.25% marcaine was injected. Lesioning was carried out for 60 seconds at 80 degrees celsius. The probe was then rotated 180 degrees, and lesioning was repeated. The above procedure was repeated at the levels of the Right  L5 and S1 medial branch nerves. At the level of Right  L5 medial branch nerve, motor onset was 0.4 volts. Testing for motor and sensory was negative into the lower extremities and groin to 3.0 volts at Right L5 medial branch nerve. At the level of Right S1 medial branch nerve, motor onset was 0.6 volts. Testing for motor and sensory was negative into the lower extremities and groin to 3.0 volts at Right  S1 medial branch nerve.     Following lesioning of all 3 nerves, 0.25 mL of kenalog 40 mg/mL was

## 2023-05-22 ENCOUNTER — OFFICE VISIT (OUTPATIENT)
Dept: ORTHOPEDIC SURGERY | Age: 58
End: 2023-05-22

## 2023-05-22 DIAGNOSIS — M51.36 DISC DEGENERATION, LUMBAR: ICD-10-CM

## 2023-05-22 DIAGNOSIS — M47.816 LUMBAR FACET ARTHROPATHY: Primary | ICD-10-CM

## 2023-05-22 DIAGNOSIS — M47.816 LUMBAR SPONDYLOSIS: ICD-10-CM

## 2023-05-22 RX ORDER — TRIAMCINOLONE ACETONIDE 40 MG/ML
40 INJECTION, SUSPENSION INTRA-ARTICULAR; INTRAMUSCULAR ONCE
Status: COMPLETED | OUTPATIENT
Start: 2023-05-22 | End: 2023-05-22

## 2023-05-22 RX ADMIN — TRIAMCINOLONE ACETONIDE 40 MG: 40 INJECTION, SUSPENSION INTRA-ARTICULAR; INTRAMUSCULAR at 08:42

## 2023-05-22 NOTE — PROGRESS NOTES
Name: Eyal Burch  YOB: 1965  Gender: female  MRN: [id number]    PROCEDURE: Left  L4, L5, and S1 medial branch nerve/facet joint radiofrequency denervation     Precautions:  Eyal Burch denies prior sensitivity to steroid, local anesthetic, iodine, or shellfish. The procedure was discussed at length with the patient and informed consent was signed and placed in the chart. She had great relief of her usual low back pain from previous L4-L5 and L5-S1 bilateral  facet joint injections. The patient was placed in a prone position on the fluoroscopy table and the skin was prepped and draped in a routine sterile fashion with Hibaclens. The area where the medial branch nerve lies for Left L4 was identified under fluoroscopy, and the area to be injected was anesthetized with 1 mL of 1% Lidocaine. A 20 gauge 10 cm Neurotherm radiofrequency needle was carefully advanced under fluoroscopic guidance to area where the medial branch nerve for Left L4 lies. Bony contact was made. Testing for motor and sensory was negative into the lower extremities and groin to 3 volts. Motor onset was 0.3 volts. Aspiration was negative. An injectate of 1 mL of 0.25% marcaine was injected. Lesioning was carried out for 60 seconds at 80 degrees celsius. The probe was then rotated 180 degrees, and lesioning was repeated. The above procedure was repeated at the levels of the Left  L5 and left S1 medial branch nerves. At the level of Left  L5 medial branch nerve, motor onset was 0.4 volts. Testing for motor and sensory was negative into the lower extremities and groin to 3.0 volts at Left L5 medial branch nerve. At the level of Left S1 medial branch nerve, motor onset was 0.6 volts. Testing for motor and sensory was negative into the lower extremities and groin to 3.0 volts at Left  S1 medial branch nerve.     Following lesioning of all 3 nerves, 0.25 mL of kenalog 40 mg/mL was

## 2023-05-24 RX ORDER — TIZANIDINE 2 MG/1
TABLET ORAL
Qty: 45 TABLET | Refills: 1 | Status: SHIPPED | OUTPATIENT
Start: 2023-05-24

## 2023-06-07 RX ORDER — TIZANIDINE 2 MG/1
TABLET ORAL
Qty: 45 TABLET | Refills: 1 | OUTPATIENT
Start: 2023-06-07

## 2023-06-29 ENCOUNTER — OFFICE VISIT (OUTPATIENT)
Dept: ORTHOPEDIC SURGERY | Age: 58
End: 2023-06-29

## 2023-06-29 DIAGNOSIS — M51.36 DISC DEGENERATION, LUMBAR: ICD-10-CM

## 2023-06-29 DIAGNOSIS — M47.816 LUMBAR SPONDYLOSIS: Primary | ICD-10-CM

## 2023-06-29 DIAGNOSIS — M47.816 LUMBAR FACET ARTHROPATHY: ICD-10-CM

## 2023-06-29 DIAGNOSIS — M54.16 LUMBAR RADICULOPATHY: ICD-10-CM

## 2023-06-29 DIAGNOSIS — M43.10 ACQUIRED SPONDYLOLISTHESIS: ICD-10-CM

## 2023-06-29 RX ORDER — TRIAMCINOLONE ACETONIDE 40 MG/ML
40 INJECTION, SUSPENSION INTRA-ARTICULAR; INTRAMUSCULAR ONCE
Status: COMPLETED | OUTPATIENT
Start: 2023-06-29 | End: 2023-06-29

## 2023-06-29 RX ADMIN — TRIAMCINOLONE ACETONIDE 40 MG: 40 INJECTION, SUSPENSION INTRA-ARTICULAR; INTRAMUSCULAR at 13:32

## 2023-08-10 ENCOUNTER — TELEPHONE (OUTPATIENT)
Dept: ORTHOPEDIC SURGERY | Age: 58
End: 2023-08-10

## 2023-08-10 DIAGNOSIS — M54.16 LUMBAR RADICULOPATHY: ICD-10-CM

## 2023-08-10 DIAGNOSIS — M47.816 LUMBAR SPONDYLOSIS: Primary | ICD-10-CM

## 2023-08-10 DIAGNOSIS — M47.816 LUMBAR FACET ARTHROPATHY: ICD-10-CM

## 2023-08-10 DIAGNOSIS — M51.36 DISC DEGENERATION, LUMBAR: ICD-10-CM

## 2023-08-21 ENCOUNTER — OFFICE VISIT (OUTPATIENT)
Dept: ORTHOPEDIC SURGERY | Age: 58
End: 2023-08-21
Payer: COMMERCIAL

## 2023-08-21 DIAGNOSIS — M79.671 RIGHT FOOT PAIN: Primary | ICD-10-CM

## 2023-08-21 DIAGNOSIS — M25.371 RIGHT ANKLE INSTABILITY: ICD-10-CM

## 2023-08-21 PROCEDURE — 99214 OFFICE O/P EST MOD 30 MIN: CPT | Performed by: ORTHOPAEDIC SURGERY

## 2023-08-21 NOTE — PROGRESS NOTES
Name: Nataliia Stahl  YOB: 1965  Gender: female  MRN: [id number]    Summary:   Right lateral ankle synovitis       CC: No chief complaint on file. HPI: Nataliia Stahl is a 62 y.o. female who presents with No chief complaint on file. .  This patient presents the office with a history of right lateral sided ankle pain and    History was obtained by Patient     ROS/Meds/PSH/PMH/FH/SH: I personally reviewed the patients standard intake form. Below are the pertinents    Tobacco:  reports that she has never smoked. She has never used smokeless tobacco.  Diabetes: None      Physical Examination:    Exam of the right foot and ankle shows mild tenderness at the anterolateral joint line. Has excellent stability talar tilt testing and drawer testing. He has grade 5 strength throughout. Right foot    Imaging:   I independently interpreted XR taken today  Right foot XR: AP, Lateral, Oblique views     ICD-10-CM    1. Right foot pain  M79.671 XR FOOT RIGHT (MIN 3 VIEWS)      2. Right ankle instability  M25.371          Report: AP, lateral, oblique x-ray of the right foot demonstrates no fracture    Impression: No fracture   Dorcas Almanzar III, MD           Assessment:   Right ankle synovitis    Treatment Plan:   4 This is a chronic illness/condition with exacerbation and progression  Treatment at this time: ASO - lace up Ankle  Studies ordered: NO XR needed @ Next Visit    Weight-bearing status: WBAT        Return to work/work restrictions: none  No medications given    Her ankle is very stable. Recommended trying lace up ankle brace to see if this might help little bit. I think a lot of this may be referred pain from her back.

## 2023-09-03 ENCOUNTER — PATIENT MESSAGE (OUTPATIENT)
Dept: ORTHOPEDIC SURGERY | Age: 58
End: 2023-09-03

## 2023-09-05 DIAGNOSIS — M54.16 LUMBAR RADICULOPATHY: ICD-10-CM

## 2023-09-05 DIAGNOSIS — M43.10 ACQUIRED SPONDYLOLISTHESIS: ICD-10-CM

## 2023-09-05 DIAGNOSIS — M47.816 LUMBAR SPONDYLOSIS: Primary | ICD-10-CM

## 2023-09-05 DIAGNOSIS — M51.36 DISC DEGENERATION, LUMBAR: ICD-10-CM

## 2023-09-05 DIAGNOSIS — M47.816 SPONDYLOSIS WITHOUT MYELOPATHY OR RADICULOPATHY, LUMBAR REGION: ICD-10-CM
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
## 2023-09-18 ENCOUNTER — OFFICE VISIT (OUTPATIENT)
Dept: ORTHOPEDIC SURGERY | Age: 58
End: 2023-09-18
Payer: COMMERCIAL

## 2023-09-18 ENCOUNTER — TELEPHONE (OUTPATIENT)
Dept: ORTHOPEDIC SURGERY | Age: 58
End: 2023-09-18

## 2023-09-18 DIAGNOSIS — M47.816 LUMBAR SPONDYLOSIS: Primary | ICD-10-CM

## 2023-09-18 DIAGNOSIS — M54.16 LUMBAR RADICULOPATHY: ICD-10-CM

## 2023-09-18 DIAGNOSIS — M47.816 LUMBAR FACET ARTHROPATHY: ICD-10-CM

## 2023-09-18 DIAGNOSIS — M51.36 DISC DEGENERATION, LUMBAR: ICD-10-CM

## 2023-09-18 PROCEDURE — 64483 NJX AA&/STRD TFRM EPI L/S 1: CPT | Performed by: PHYSICAL MEDICINE & REHABILITATION

## 2023-09-18 RX ORDER — TRIAMCINOLONE ACETONIDE 40 MG/ML
40 INJECTION, SUSPENSION INTRA-ARTICULAR; INTRAMUSCULAR ONCE
Status: COMPLETED | OUTPATIENT
Start: 2023-09-18 | End: 2023-09-18

## 2023-09-18 RX ADMIN — TRIAMCINOLONE ACETONIDE 40 MG: 40 INJECTION, SUSPENSION INTRA-ARTICULAR; INTRAMUSCULAR at 08:52

## 2023-09-18 NOTE — PROGRESS NOTES
Name: Ashley Benjamin  YOB: 1965  Gender: female  MRN: [id number]        Transforaminal [facility]ESICA Procedure Note    Procedure: Bilateral  L5-S1 transforaminal epidural steroid injections     Precautions: Ashley Benjamin denies prior sensitivity to steroid, local anesthetic, iodine, or shellfish. Consent:  Consent was obtained prior to the procedure. The procedure was discussed at length with Ashley Benjamin. She was given the opportunity to ask questions regarding the procedure and its associated risks. In addition to the potential risks associated with the procedure itself, the patient was informed both verbally and in writing of potential side effects of the use glucocorticoids. The patient appeared to comprehend the informed consent and desired to have the procedure performed, and informed consent was signed. She was placed in a prone position on the fluoroscopy table and the skin was prepped and draped in a routine sterile fashion. The areas to be injected were each anesthetized with 1 ml of 1% Lidocaine. A 22 gauge 3.5 inch spinal needle was carefully advanced under fluoroscopic guidance to the right L5-S1 transforaminal space  0.5 ml of 70% of Omnipaque was injected to confirm proper needle placement and absence of subdural or vascular flow Once proper placement was confirmed, 0.5ml of 0.25 marcaine and 40 mg kenalog were injected through the spinal needle. The above procedure was then repeated at the Left  L5-S1 transforaminal space. Fluoroscopic guidance was used intermittently over a 10-minute period to insure proper needle placement and her safety. A hard copy of the fluoroscopic image has been placed in her chart and is saved on the C-arm hard drive. She was monitored for 30 minutes after the procedure and discharged home in a stable fashion with a routine follow up.     Procedural Diagnosis:     ICD-10-CM    1. Lumbar spondylosis  M47.816 FL

## 2023-10-05 ENCOUNTER — TELEPHONE (OUTPATIENT)
Dept: ORTHOPEDIC SURGERY | Age: 58
End: 2023-10-05

## 2023-10-05 DIAGNOSIS — M47.816 LUMBAR FACET ARTHROPATHY: ICD-10-CM

## 2023-10-05 DIAGNOSIS — M51.36 DISC DEGENERATION, LUMBAR: ICD-10-CM

## 2023-10-05 DIAGNOSIS — M47.816 LUMBAR SPONDYLOSIS: Primary | ICD-10-CM

## 2023-10-05 DIAGNOSIS — M54.16 LUMBAR RADICULOPATHY: ICD-10-CM

## 2023-10-26 ENCOUNTER — APPOINTMENT (RX ONLY)
Dept: URBAN - METROPOLITAN AREA CLINIC 330 | Facility: CLINIC | Age: 58
Setting detail: DERMATOLOGY
End: 2023-10-26

## 2023-10-26 DIAGNOSIS — B07.8 OTHER VIRAL WARTS: ICD-10-CM

## 2023-10-26 DIAGNOSIS — L82.0 INFLAMED SEBORRHEIC KERATOSIS: ICD-10-CM

## 2023-10-26 PROCEDURE — ? COUNSELING

## 2023-10-26 PROCEDURE — ? TREATMENT REGIMEN

## 2023-10-26 PROCEDURE — 17110 DESTRUCTION B9 LES UP TO 14: CPT

## 2023-10-26 PROCEDURE — ? LIQUID NITROGEN

## 2023-10-26 PROCEDURE — ? FULL BODY SKIN EXAM - DECLINED

## 2023-10-26 ASSESSMENT — LOCATION DETAILED DESCRIPTION DERM
LOCATION DETAILED: RIGHT MEDIAL FOREHEAD
LOCATION DETAILED: LEFT SUPERIOR FOREHEAD
LOCATION DETAILED: LEFT LATERAL FOREHEAD
LOCATION DETAILED: LEFT FOREHEAD
LOCATION DETAILED: LEFT ULNAR DORSAL HAND
LOCATION DETAILED: LEFT INFERIOR MEDIAL FOREHEAD
LOCATION DETAILED: LEFT ELBOW

## 2023-10-26 ASSESSMENT — LOCATION SIMPLE DESCRIPTION DERM
LOCATION SIMPLE: RIGHT FOREHEAD
LOCATION SIMPLE: LEFT FOREHEAD
LOCATION SIMPLE: LEFT ELBOW
LOCATION SIMPLE: LEFT HAND

## 2023-10-26 ASSESSMENT — LOCATION ZONE DERM
LOCATION ZONE: HAND
LOCATION ZONE: ARM
LOCATION ZONE: FACE

## 2023-10-26 ASSESSMENT — PAIN INTENSITY VAS: HOW INTENSE IS YOUR PAIN 0 BEING NO PAIN, 10 BEING THE MOST SEVERE PAIN POSSIBLE?: 1/10 PAIN

## 2023-10-26 NOTE — PROCEDURE: LIQUID NITROGEN
Spray Paint Technique: No
Medical Necessity Clause: This procedure was medically necessary because the lesions that were treated were:
Show Spray Paint Technique Variable?: Yes
Spray Paint Text: The liquid nitrogen was applied to the skin utilizing a spray paint frosting technique.
Number Of Freeze-Thaw Cycles: 2 freeze-thaw cycles
Detail Level: Detailed
Post-Care Instructions: I reviewed with the patient in detail post-care instructions. Patient is to wear sunprotection, and avoid picking at any of the treated lesions. Pt may apply Vaseline to crusted or scabbing areas.
Medical Necessity Information: It is in your best interest to select a reason for this procedure from the list below. All of these items fulfill various CMS LCD requirements except the new and changing color options.
Consent: The patient's consent was obtained including but not limited to risks of crusting, scabbing, blistering, scarring, darker or lighter pigmentary change, recurrence, incomplete removal and infection.

## 2023-10-29 ENCOUNTER — PATIENT MESSAGE (OUTPATIENT)
Dept: ORTHOPEDIC SURGERY | Age: 58
End: 2023-10-29

## 2023-10-29 DIAGNOSIS — M47.816 LUMBAR SPONDYLOSIS: Primary | ICD-10-CM

## 2023-11-08 DIAGNOSIS — M47.816 LUMBAR FACET ARTHROPATHY: ICD-10-CM

## 2023-11-08 DIAGNOSIS — M47.816 LUMBAR SPONDYLOSIS: Primary | ICD-10-CM

## 2023-11-08 DIAGNOSIS — M51.36 DISC DEGENERATION, LUMBAR: ICD-10-CM

## 2023-11-08 RX ORDER — DIAZEPAM 10 MG/1
TABLET ORAL
Qty: 2 TABLET | Refills: 0 | Status: SHIPPED | OUTPATIENT
Start: 2023-11-08 | End: 2023-12-01

## 2023-11-20 ENCOUNTER — TELEPHONE (OUTPATIENT)
Dept: ORTHOPEDIC SURGERY | Age: 58
End: 2023-11-20

## 2023-11-20 DIAGNOSIS — M47.816 LUMBAR SPONDYLOSIS: ICD-10-CM

## 2023-11-20 DIAGNOSIS — M51.36 DISC DEGENERATION, LUMBAR: Primary | ICD-10-CM

## 2023-11-20 DIAGNOSIS — M54.16 LUMBAR RADICULOPATHY: ICD-10-CM

## 2023-11-27 ENCOUNTER — OFFICE VISIT (OUTPATIENT)
Dept: ORTHOPEDIC SURGERY | Age: 58
End: 2023-11-27
Payer: COMMERCIAL

## 2023-11-27 DIAGNOSIS — M47.816 LUMBAR SPONDYLOSIS: ICD-10-CM

## 2023-11-27 DIAGNOSIS — M51.36 DISC DEGENERATION, LUMBAR: Primary | ICD-10-CM

## 2023-11-27 DIAGNOSIS — M47.816 LUMBAR FACET ARTHROPATHY: ICD-10-CM

## 2023-11-27 PROCEDURE — 64635 DESTROY LUMB/SAC FACET JNT: CPT | Performed by: PHYSICAL MEDICINE & REHABILITATION

## 2023-11-27 PROCEDURE — 64636 DESTROY L/S FACET JNT ADDL: CPT | Performed by: PHYSICAL MEDICINE & REHABILITATION

## 2023-11-27 RX ORDER — TRIAMCINOLONE ACETONIDE 40 MG/ML
40 INJECTION, SUSPENSION INTRA-ARTICULAR; INTRAMUSCULAR ONCE
Status: COMPLETED | OUTPATIENT
Start: 2023-11-27 | End: 2023-11-27

## 2023-11-27 RX ADMIN — TRIAMCINOLONE ACETONIDE 40 MG: 40 INJECTION, SUSPENSION INTRA-ARTICULAR; INTRAMUSCULAR at 16:03

## 2023-11-27 NOTE — PROGRESS NOTES
Name: Erwin Caal  YOB: 1965  Gender: female  MRN: [id number]    PROCEDURE: Right  L4, L5, and S1 medial branch nerve/facet joint radiofrequency denervation     Precautions:  Erwin Caal denies prior sensitivity to steroid, local anesthetic, iodine, or shellfish. The procedure was discussed at length with the patient and informed consent was signed and placed in the chart. She had great relief of her usual low back pain from previous L4-L5 and L5-S1 bilateral  facet joint injections. The patient was placed in a prone position on the fluoroscopy table and the skin was prepped and draped in a routine sterile fashion with Hibaclens. The area where the medial branch nerve lies for Right L4 was identified under fluoroscopy, and the area to be injected was anesthetized with 1 mL of 1% Lidocaine. A 20 gauge 10 cm Neurotherm radiofrequency needle was carefully advanced under fluoroscopic guidance to area where the medial branch nerve for Right L4 lies. Bony contact was made. Testing for motor and sensory was negative into the lower extremities and groin to 3 volts. Motor onset was 0.3 volts. Aspiration was negative. An injectate of 1 mL of 0.25% marcaine was injected. Lesioning was carried out for 60 seconds at 80 degrees celsius. The probe was then rotated 180 degrees, and lesioning was repeated. The above procedure was repeated at the levels of the Right  L5 and right S1 medial branch nerves. At the level of Right  L5 medial branch nerve, motor onset was 0.2 volts. Testing for motor and sensory was negative into the lower extremities and groin to 3.0 volts at Right L5 medial branch nerve. At the level of Right S1 medial branch nerve, motor onset was 0.3 volts. Testing for motor and sensory was negative into the lower extremities and groin to 3.0 volts at Right  S1 medial branch nerve.     Following lesioning of all 3 nerves, 0.25 mL of kenalog 40

## 2023-11-30 ENCOUNTER — OFFICE VISIT (OUTPATIENT)
Dept: ORTHOPEDIC SURGERY | Age: 58
End: 2023-11-30
Payer: COMMERCIAL

## 2023-11-30 DIAGNOSIS — M47.816 LUMBAR FACET ARTHROPATHY: Primary | ICD-10-CM

## 2023-11-30 PROCEDURE — 64636 DESTROY L/S FACET JNT ADDL: CPT | Performed by: PHYSICAL MEDICINE & REHABILITATION

## 2023-11-30 PROCEDURE — 64635 DESTROY LUMB/SAC FACET JNT: CPT | Performed by: PHYSICAL MEDICINE & REHABILITATION

## 2023-11-30 RX ORDER — TRIAMCINOLONE ACETONIDE 40 MG/ML
40 INJECTION, SUSPENSION INTRA-ARTICULAR; INTRAMUSCULAR ONCE
Status: COMPLETED | OUTPATIENT
Start: 2023-11-30 | End: 2023-11-30

## 2023-11-30 RX ADMIN — TRIAMCINOLONE ACETONIDE 40 MG: 40 INJECTION, SUSPENSION INTRA-ARTICULAR; INTRAMUSCULAR at 16:13

## 2023-11-30 NOTE — PROGRESS NOTES
Name: Ting Dumont  YOB: 1965  Gender: female  MRN: [id number]    PROCEDURE: Left  L4, L5, and S1 medial branch nerve/facet joint radiofrequency denervation     Precautions:  Ting Dumont denies prior sensitivity to steroid, local anesthetic, iodine, or shellfish. The procedure was discussed at length with the patient and informed consent was signed and placed in the chart. She had great relief of her usual low back pain from previous L4-L5 and L5-S1 bilateral  facet joint injections. The patient was placed in a prone position on the fluoroscopy table and the skin was prepped and draped in a routine sterile fashion with Hibaclens. The area where the medial branch nerve lies for Left L4 was identified under fluoroscopy, and the area to be injected was anesthetized with 1 mL of 1% Lidocaine. A 20 gauge 10 cm Neurotherm radiofrequency needle was carefully advanced under fluoroscopic guidance to area where the medial branch nerve for Left L4 lies. Bony contact was made. Testing for motor and sensory was negative into the lower extremities and groin to 3 volts. Motor onset was 0.4 volts. Aspiration was negative. An injectate of 1 mL of 0.25% marcaine was injected. Lesioning was carried out for 60 seconds at 80 degrees celsius. The probe was then rotated 180 degrees, and lesioning was repeated. The above procedure was repeated at the levels of the Left  L5 and left S1 medial branch nerves. At the level of Left  L5 medial branch nerve, motor onset was 0.4 volts. Testing for motor and sensory was negative into the lower extremities and groin to 3.0 volts at Left L5 medial branch nerve. At the level of Left S1 medial branch nerve, motor onset was 0.5 volts. Testing for motor and sensory was negative into the lower extremities and groin to 3.0 volts at Left  S1 medial branch nerve.     Following lesioning of all 3 nerves, 0.25 mL of kenalog 40 mg/mL was

## 2023-12-15 ENCOUNTER — APPOINTMENT (RX ONLY)
Dept: URBAN - METROPOLITAN AREA CLINIC 330 | Facility: CLINIC | Age: 58
Setting detail: DERMATOLOGY
End: 2023-12-15

## 2023-12-15 DIAGNOSIS — L57.0 ACTINIC KERATOSIS: ICD-10-CM

## 2023-12-15 DIAGNOSIS — L82.1 OTHER SEBORRHEIC KERATOSIS: ICD-10-CM

## 2023-12-15 DIAGNOSIS — L82.0 INFLAMED SEBORRHEIC KERATOSIS: ICD-10-CM

## 2023-12-15 PROCEDURE — ? COUNSELING

## 2023-12-15 PROCEDURE — 99212 OFFICE O/P EST SF 10 MIN: CPT | Mod: 25

## 2023-12-15 PROCEDURE — 17110 DESTRUCTION B9 LES UP TO 14: CPT

## 2023-12-15 PROCEDURE — 17000 DESTRUCT PREMALG LESION: CPT | Mod: 59

## 2023-12-15 PROCEDURE — ? LIQUID NITROGEN

## 2023-12-15 PROCEDURE — ? ELECTRODESICCATION

## 2023-12-15 ASSESSMENT — LOCATION DETAILED DESCRIPTION DERM
LOCATION DETAILED: LEFT MEDIAL FOREHEAD
LOCATION DETAILED: LEFT RADIAL DORSAL HAND
LOCATION DETAILED: RIGHT RADIAL DORSAL HAND
LOCATION DETAILED: RIGHT LATERAL SUPERIOR EYELID
LOCATION DETAILED: LEFT PROXIMAL DORSAL FOREARM
LOCATION DETAILED: RIGHT PROXIMAL DORSAL FOREARM
LOCATION DETAILED: LEFT PROXIMAL ULNAR DORSAL FOREARM
LOCATION DETAILED: LEFT DISTAL DORSAL FOREARM
LOCATION DETAILED: LEFT PROXIMAL PRETIBIAL REGION

## 2023-12-15 ASSESSMENT — LOCATION SIMPLE DESCRIPTION DERM
LOCATION SIMPLE: RIGHT FOREARM
LOCATION SIMPLE: RIGHT HAND
LOCATION SIMPLE: LEFT FOREHEAD
LOCATION SIMPLE: LEFT FOREARM
LOCATION SIMPLE: LEFT HAND
LOCATION SIMPLE: LEFT PRETIBIAL REGION
LOCATION SIMPLE: RIGHT SUPERIOR EYELID

## 2023-12-15 ASSESSMENT — LOCATION ZONE DERM
LOCATION ZONE: ARM
LOCATION ZONE: HAND
LOCATION ZONE: LEG
LOCATION ZONE: FACE
LOCATION ZONE: EYELID

## 2023-12-15 NOTE — PROCEDURE: MIPS QUALITY
Quality 130: Documentation Of Current Medications In The Medical Record: Current Medications Documented
Quality 431: Preventive Care And Screening: Unhealthy Alcohol Use - Screening: Patient not identified as an unhealthy alcohol user when screened for unhealthy alcohol use using a systematic screening method
show
Quality 402: Tobacco Use And Help With Quitting Among Adolescents: Patient screened for tobacco and never smoked
Detail Level: Detailed
Quality 226: Preventive Care And Screening: Tobacco Use: Screening And Cessation Intervention: Patient screened for tobacco use and is an ex/non-smoker

## 2023-12-15 NOTE — PROCEDURE: ELECTRODESICCATION
Post-Care Instructions: I reviewed with the patient in detail post-care instructions. Patient is to wear sunprotection, and avoid picking at any of the treated lesions. Pt may apply Vaseline to crusted or scabbing areas
Anesthesia Type: 1% lidocaine with epinephrine
Medical Necessity Information: It is in your best interest to select a reason for this procedure from the list below. All of these items fulfill various CMS LCD requirements except the new and changing color options.
Include Z78.9 (Other Specified Conditions Influencing Health Status) As An Associated Diagnosis?: No
Detail Level: Simple
Medical Necessity Clause: This procedure was medically necessary because the lesions that were treated were:
Consent: The patient's consent was obtained including but not limited to risks of crusting, scabbing, blistering, scarring, darker or lighter pigmentary change, recurrence, incomplete removal and infection.

## 2023-12-15 NOTE — PROCEDURE: LIQUID NITROGEN
Render Post-Care Instructions In Note?: no
Consent: The patient's consent was obtained including but not limited to risks of crusting, scabbing, blistering, scarring, darker or lighter pigmentary change, recurrence, incomplete removal and infection.
Medical Necessity Clause: This procedure was medically necessary because the lesions that were treated were:
Show Topical Anesthesia Variable?: Yes
Number Of Freeze-Thaw Cycles: 1 freeze-thaw cycle
Post-Care Instructions: I reviewed with the patient in detail post-care instructions. Patient is to wear sunprotection, and avoid picking at any of the treated lesions. Pt may apply Vaseline to crusted or scabbing areas.
Detail Level: Detailed
Medical Necessity Information: It is in your best interest to select a reason for this procedure from the list below. All of these items fulfill various CMS LCD requirements except the new and changing color options.
Spray Paint Text: The liquid nitrogen was applied to the skin utilizing a spray paint frosting technique.
Duration Of Freeze Thaw-Cycle (Seconds): 0

## 2023-12-29 ENCOUNTER — PATIENT MESSAGE (OUTPATIENT)
Dept: ORTHOPEDIC SURGERY | Age: 58
End: 2023-12-29

## 2023-12-29 DIAGNOSIS — M47.816 LUMBAR SPONDYLOSIS: ICD-10-CM

## 2023-12-29 DIAGNOSIS — M51.36 DISC DEGENERATION, LUMBAR: ICD-10-CM

## 2023-12-29 DIAGNOSIS — M47.816 LUMBAR FACET ARTHROPATHY: Primary | ICD-10-CM

## 2023-12-29 DIAGNOSIS — M54.16 LUMBAR RADICULOPATHY: ICD-10-CM

## 2024-01-18 ENCOUNTER — OFFICE VISIT (OUTPATIENT)
Dept: ORTHOPEDIC SURGERY | Age: 59
End: 2024-01-18
Payer: COMMERCIAL

## 2024-01-18 DIAGNOSIS — M47.816 LUMBAR FACET ARTHROPATHY: Primary | ICD-10-CM

## 2024-01-18 PROCEDURE — 64483 NJX AA&/STRD TFRM EPI L/S 1: CPT | Performed by: PHYSICAL MEDICINE & REHABILITATION

## 2024-01-18 RX ORDER — TRIAMCINOLONE ACETONIDE 40 MG/ML
40 INJECTION, SUSPENSION INTRA-ARTICULAR; INTRAMUSCULAR ONCE
Status: COMPLETED | OUTPATIENT
Start: 2024-01-18 | End: 2024-01-18

## 2024-01-18 RX ADMIN — TRIAMCINOLONE ACETONIDE 40 MG: 40 INJECTION, SUSPENSION INTRA-ARTICULAR; INTRAMUSCULAR at 16:07

## 2024-01-18 NOTE — PROGRESS NOTES
Name: Leticia Rizo  YOB: 1965  Gender: female  MRN: [id number]        Transforaminal JESICA Procedure Note    Procedure: Bilateral  L5-S1 transforaminal epidural steroid injections     Precautions: Leticia Rizo denies prior sensitivity to steroid, local anesthetic, iodine, or shellfish.       Consent:  Consent was obtained prior to the procedure. The procedure was discussed at length with Leticia Rizo. She was given the opportunity to ask questions regarding the procedure and its associated risks.  In addition to the potential risks associated with the procedure itself, the patient was informed both verbally and in writing of potential side effects of the use glucocorticoids.  The patient appeared to comprehend the informed consent and desired to have the procedure performed, and informed consent was signed.     She was placed in a prone position on the fluoroscopy table and the skin was prepped and draped in a routine sterile fashion. The areas to be injected were each anesthetized with 1 ml of 1% Lidocaine. A 22 gauge 3.5 inch spinal needle was carefully advanced under fluoroscopic guidance to the right L5-S1 transforaminal space  0.5 ml of 70% of Omnipaque was injected to confirm proper needle placement and absence of subdural or vascular flow Once proper placement was confirmed, 0.5ml of 0.25 marcaine and 40 mg kenalog were injected through the spinal needle.       The above procedure was then repeated at the Left  L5-S1 transforaminal space.    Fluoroscopic guidance was used intermittently over a 10-minute period to insure proper needle placement and her safety. A hard copy of the fluoroscopic image has been placed in her chart and is saved on the C-arm hard drive. She was monitored for 30 minutes after the procedure and discharged home in a stable fashion with a routine follow up.    Procedural Diagnosis:     ICD-10-CM    1. Lumbar facet arthropathy

## 2024-03-15 ENCOUNTER — APPOINTMENT (RX ONLY)
Dept: URBAN - METROPOLITAN AREA CLINIC 330 | Facility: CLINIC | Age: 59
Setting detail: DERMATOLOGY
End: 2024-03-15

## 2024-03-15 DIAGNOSIS — L82.1 OTHER SEBORRHEIC KERATOSIS: ICD-10-CM

## 2024-03-15 DIAGNOSIS — L82.0 INFLAMED SEBORRHEIC KERATOSIS: ICD-10-CM

## 2024-03-15 DIAGNOSIS — D22 MELANOCYTIC NEVI: ICD-10-CM

## 2024-03-15 DIAGNOSIS — I83.9 ASYMPTOMATIC VARICOSE VEINS OF LOWER EXTREMITIES: ICD-10-CM

## 2024-03-15 DIAGNOSIS — L57.8 OTHER SKIN CHANGES DUE TO CHRONIC EXPOSURE TO NONIONIZING RADIATION: ICD-10-CM

## 2024-03-15 DIAGNOSIS — L30.9 DERMATITIS, UNSPECIFIED: ICD-10-CM | Status: INADEQUATELY CONTROLLED

## 2024-03-15 PROBLEM — I83.93 ASYMPTOMATIC VARICOSE VEINS OF BILATERAL LOWER EXTREMITIES: Status: ACTIVE | Noted: 2024-03-15

## 2024-03-15 PROBLEM — D22.5 MELANOCYTIC NEVI OF TRUNK: Status: ACTIVE | Noted: 2024-03-15

## 2024-03-15 PROCEDURE — ? TREATMENT REGIMEN

## 2024-03-15 PROCEDURE — ? ELECTRODESICCATION

## 2024-03-15 PROCEDURE — ? LIQUID NITROGEN

## 2024-03-15 PROCEDURE — ? BODY PHOTOGRAPHY

## 2024-03-15 PROCEDURE — 99213 OFFICE O/P EST LOW 20 MIN: CPT | Mod: 25

## 2024-03-15 PROCEDURE — ? ADDITIONAL NOTES

## 2024-03-15 PROCEDURE — 17110 DESTRUCTION B9 LES UP TO 14: CPT

## 2024-03-15 PROCEDURE — ? COUNSELING

## 2024-03-15 PROCEDURE — ? OTHER

## 2024-03-15 PROCEDURE — ? FULL BODY SKIN EXAM

## 2024-03-15 ASSESSMENT — LOCATION ZONE DERM
LOCATION ZONE: NECK
LOCATION ZONE: EYELID
LOCATION ZONE: EAR
LOCATION ZONE: LEG
LOCATION ZONE: TRUNK

## 2024-03-15 ASSESSMENT — SEVERITY ASSESSMENT: SEVERITY: MILD

## 2024-03-15 ASSESSMENT — LOCATION SIMPLE DESCRIPTION DERM
LOCATION SIMPLE: LEFT BUTTOCK
LOCATION SIMPLE: RIGHT THIGH
LOCATION SIMPLE: RIGHT PRETIBIAL REGION
LOCATION SIMPLE: UPPER BACK
LOCATION SIMPLE: LEFT PRETIBIAL REGION
LOCATION SIMPLE: LEFT ANTERIOR NECK
LOCATION SIMPLE: LEFT EAR
LOCATION SIMPLE: LEFT LOWER LEG
LOCATION SIMPLE: LEFT THIGH
LOCATION SIMPLE: RIGHT SUPERIOR EYELID
LOCATION SIMPLE: LEFT UPPER BACK

## 2024-03-15 ASSESSMENT — LOCATION DETAILED DESCRIPTION DERM
LOCATION DETAILED: LEFT SUPERIOR LATERAL NECK
LOCATION DETAILED: LEFT DISTAL PRETIBIAL REGION
LOCATION DETAILED: LEFT LATERAL PROXIMAL CALF
LOCATION DETAILED: RIGHT ANTERIOR DISTAL THIGH
LOCATION DETAILED: RIGHT DISTAL PRETIBIAL REGION
LOCATION DETAILED: RIGHT MEDIAL SUPERIOR EYELID
LOCATION DETAILED: INFERIOR THORACIC SPINE
LOCATION DETAILED: LEFT TRIANGULAR FOSSA
LOCATION DETAILED: SUPERIOR THORACIC SPINE
LOCATION DETAILED: LEFT MEDIAL UPPER BACK
LOCATION DETAILED: RIGHT MEDIAL PROXIMAL PRETIBIAL REGION
LOCATION DETAILED: LEFT BUTTOCK
LOCATION DETAILED: LEFT ANTERIOR DISTAL THIGH

## 2024-03-15 ASSESSMENT — BSA RASH: BSA RASH: 1

## 2024-03-15 ASSESSMENT — ITCH NUMERIC RATING SCALE: HOW SEVERE IS YOUR ITCHING?: 0

## 2024-03-15 NOTE — PROCEDURE: ADDITIONAL NOTES
Render Risk Assessment In Note?: no
Detail Level: Simple
Additional Notes: Encouraged Aquaphor/Vaseline and OTC hydrocortisone
Additional Notes: Offered referral to vascular surgery; pt declines

## 2024-03-15 NOTE — PROCEDURE: LIQUID NITROGEN
Detail Level: Detailed
Consent: The patient's consent was obtained including but not limited to risks of crusting, scabbing, blistering, scarring, darker or lighter pigmentary change, recurrence, incomplete removal and infection.
Show Spray Paint Technique Variable?: Yes
Render Post-Care Instructions In Note?: no
Post-Care Instructions: I reviewed with the patient in detail post-care instructions. Patient is to wear sunprotection, and avoid picking at any of the treated lesions. Pt may apply Vaseline to crusted or scabbing areas.
Spray Paint Text: The liquid nitrogen was applied to the skin utilizing a spray paint frosting technique.
Number Of Freeze-Thaw Cycles: 1 freeze-thaw cycle
Medical Necessity Information: It is in your best interest to select a reason for this procedure from the list below. All of these items fulfill various CMS LCD requirements except the new and changing color options.
Medical Necessity Clause: This procedure was medically necessary because the lesions that were treated were:

## 2024-03-25 ENCOUNTER — PATIENT MESSAGE (OUTPATIENT)
Dept: ORTHOPEDIC SURGERY | Age: 59
End: 2024-03-25

## 2024-03-25 DIAGNOSIS — M47.816 LUMBAR FACET ARTHROPATHY: Primary | ICD-10-CM

## 2024-04-10 DIAGNOSIS — M47.816 LUMBAR FACET ARTHROPATHY: Primary | ICD-10-CM

## 2024-04-17 ENCOUNTER — TELEPHONE (OUTPATIENT)
Dept: ORTHOPEDIC SURGERY | Age: 59
End: 2024-04-17

## 2024-04-17 DIAGNOSIS — M47.816 LUMBAR FACET ARTHROPATHY: Primary | ICD-10-CM

## 2024-04-17 RX ORDER — DIAZEPAM 10 MG/1
TABLET ORAL
Qty: 2 TABLET | Refills: 0 | Status: SHIPPED | OUTPATIENT
Start: 2024-04-17 | End: 2024-05-03

## 2024-04-17 NOTE — TELEPHONE ENCOUNTER
Call was returned to patient and she has been scheduled for the RFA. Insurance notified the office that this has been approved.

## 2024-04-29 ENCOUNTER — OFFICE VISIT (OUTPATIENT)
Dept: ORTHOPEDIC SURGERY | Age: 59
End: 2024-04-29
Payer: COMMERCIAL

## 2024-04-29 DIAGNOSIS — M47.816 LUMBAR FACET ARTHROPATHY: Primary | ICD-10-CM

## 2024-04-29 PROCEDURE — 64635 DESTROY LUMB/SAC FACET JNT: CPT | Performed by: PHYSICAL MEDICINE & REHABILITATION

## 2024-04-29 PROCEDURE — 64636 DESTROY L/S FACET JNT ADDL: CPT | Performed by: PHYSICAL MEDICINE & REHABILITATION

## 2024-04-29 RX ORDER — TRIAMCINOLONE ACETONIDE 40 MG/ML
40 INJECTION, SUSPENSION INTRA-ARTICULAR; INTRAMUSCULAR ONCE
Status: COMPLETED | OUTPATIENT
Start: 2024-04-29 | End: 2024-04-29

## 2024-04-29 RX ADMIN — TRIAMCINOLONE ACETONIDE 40 MG: 40 INJECTION, SUSPENSION INTRA-ARTICULAR; INTRAMUSCULAR at 08:49

## 2024-04-29 NOTE — PROGRESS NOTES
Name: Leticia Rizo  YOB: 1965  Gender: female  MRN: [id number]    PROCEDURE: Left  L4, L5, and S1 medial branch nerve/facet joint radiofrequency denervation     Precautions:  Leticia Rizo denies prior sensitivity to steroid, local anesthetic, iodine, or shellfish.     The procedure was discussed at length with the patient and informed consent was signed and placed in the chart.  She had great relief of her usual low back pain from previous L4-L5 and L5-S1 bilateral  facet joint injections and bilateral L4, L5, and S1 medial branch nerve radiofrequency denervations.    The patient was placed in a prone position on the fluoroscopy table and the skin was prepped and draped in a routine sterile fashion with Hibaclens.  The area where the medial branch nerve lies for Left L4 was identified under fluoroscopy, and the area to be injected was anesthetized with 1 mL of 1% Lidocaine.  A 20 gauge 10 cm Neurotherm radiofrequency needle was carefully advanced under fluoroscopic guidance to area where the medial branch nerve for Left L4 lies.  Bony contact was made.  Testing for motor and sensory was negative into the lower extremities and groin to 3 volts.  Motor onset was 0.3 volts. Aspiration was negative.      An injectate of 1 mL of 0.25% marcaine was injected.  Lesioning was carried out for 60 seconds at 80 degrees celsius. The probe was then rotated 180 degrees, and lesioning was repeated. The above procedure was repeated at the levels of the Left  L5 and left S1 medial branch nerves.      At the level of Left  L5 medial branch nerve, motor onset was 0.3 volts. Testing for motor and sensory was negative into the lower extremities and groin to 3.0 volts at Left L5 medial branch nerve.    At the level of Left S1 medial branch nerve, motor onset was 0.6 volts. Testing for motor and sensory was negative into the lower extremities and groin to 3.0 volts at Left  S1 medial branch

## 2024-05-02 ENCOUNTER — OFFICE VISIT (OUTPATIENT)
Dept: ORTHOPEDIC SURGERY | Age: 59
End: 2024-05-02
Payer: COMMERCIAL

## 2024-05-02 ENCOUNTER — CLINICAL DOCUMENTATION (OUTPATIENT)
Dept: ORTHOPEDIC SURGERY | Age: 59
End: 2024-05-02

## 2024-05-02 DIAGNOSIS — M47.816 LUMBAR FACET ARTHROPATHY: Primary | ICD-10-CM

## 2024-05-02 DIAGNOSIS — M54.16 LUMBAR RADICULOPATHY: ICD-10-CM

## 2024-05-02 PROCEDURE — 64636 DESTROY L/S FACET JNT ADDL: CPT | Performed by: PHYSICAL MEDICINE & REHABILITATION

## 2024-05-02 PROCEDURE — 64635 DESTROY LUMB/SAC FACET JNT: CPT | Performed by: PHYSICAL MEDICINE & REHABILITATION

## 2024-05-02 RX ORDER — TRIAMCINOLONE ACETONIDE 40 MG/ML
40 INJECTION, SUSPENSION INTRA-ARTICULAR; INTRAMUSCULAR ONCE
Status: COMPLETED | OUTPATIENT
Start: 2024-05-02 | End: 2024-05-02

## 2024-05-02 RX ADMIN — TRIAMCINOLONE ACETONIDE 40 MG: 40 INJECTION, SUSPENSION INTRA-ARTICULAR; INTRAMUSCULAR at 14:36

## 2024-05-02 NOTE — PROGRESS NOTES
Patient states she is going on a trip and would like to schedule next injection for end of May or Beginning of June. Patient is scheduled for 6/4/24 at 9 am at . . Per Dr. Back schedule Bilateral L5 SNRB. Order has been placed.

## 2024-05-02 NOTE — PROGRESS NOTES
all 3 nerves, 0.25 mL of kenalog 40 mg/mL was injected at each level.  She tolerated the procedure well.    There were no complications.  She was stable and ambulatory at discharge.                                                                                                                                                                            Fluoroscopic guidance was used intermittently over a 50-minute period to insure proper needle placement and patient safety.    Procedural Diagnosis: @    ICD-10-CM    1. Lumbar facet arthropathy  M47.816 XR DESTR PARAVERTBRL LUMB/SAC W S&I     XR DESTR PARAVERTBRL LUMB/SAC ADD LV W S&I     triamcinolone acetonide (KENALOG-40) injection 40 mg     Ambulatory Referral to Spine Injection      2. Lumbar radiculopathy  M54.16 Ambulatory Referral to Spine Injection          ONUR WHITAKER MD

## 2024-05-29 ENCOUNTER — TELEPHONE (OUTPATIENT)
Dept: ORTHOPEDIC SURGERY | Age: 59
End: 2024-05-29

## 2024-05-29 NOTE — TELEPHONE ENCOUNTER
Patient's appointment was orginally scheduled for 06/04/2024 at [facility], but Dr. Back is not at this office on Tuesday. Patient was rescheduled for 06/03/2024 at [facility] at 2:45pm

## 2024-06-03 ENCOUNTER — OFFICE VISIT (OUTPATIENT)
Dept: ORTHOPEDIC SURGERY | Age: 59
End: 2024-06-03
Payer: COMMERCIAL

## 2024-06-03 DIAGNOSIS — M51.36 DISC DEGENERATION, LUMBAR: ICD-10-CM

## 2024-06-03 DIAGNOSIS — M47.816 LUMBAR FACET ARTHROPATHY: Primary | ICD-10-CM

## 2024-06-03 DIAGNOSIS — M54.16 LUMBAR RADICULOPATHY: ICD-10-CM

## 2024-06-03 PROCEDURE — 64483 NJX AA&/STRD TFRM EPI L/S 1: CPT | Performed by: PHYSICAL MEDICINE & REHABILITATION

## 2024-06-03 RX ORDER — TRIAMCINOLONE ACETONIDE 40 MG/ML
40 INJECTION, SUSPENSION INTRA-ARTICULAR; INTRAMUSCULAR ONCE
Status: COMPLETED | OUTPATIENT
Start: 2024-06-03 | End: 2024-06-03

## 2024-06-03 RX ADMIN — TRIAMCINOLONE ACETONIDE 40 MG: 40 INJECTION, SUSPENSION INTRA-ARTICULAR; INTRAMUSCULAR at 14:50

## 2024-06-03 NOTE — PROGRESS NOTES
M47.816 FL NERVE BLOCK LUMBOSACRAL 1ST     triamcinolone acetonide (KENALOG-40) injection 40 mg      2. Lumbar radiculopathy  M54.16 FL NERVE BLOCK LUMBOSACRAL 1ST     triamcinolone acetonide (KENALOG-40) injection 40 mg      3. Disc degeneration, lumbar  M51.36 FL NERVE BLOCK LUMBOSACRAL 1ST     triamcinolone acetonide (KENALOG-40) injection 40 mg         ONUR WHITAKER MD  06/03/24

## 2024-09-30 ENCOUNTER — PATIENT MESSAGE (OUTPATIENT)
Dept: ORTHOPEDIC SURGERY | Age: 59
End: 2024-09-30

## 2024-09-30 DIAGNOSIS — M47.816 LUMBAR FACET ARTHROPATHY: Primary | ICD-10-CM

## 2024-10-03 RX ORDER — DIAZEPAM 10 MG
TABLET ORAL
Qty: 1 TABLET | Refills: 0 | Status: SHIPPED | OUTPATIENT
Start: 2024-10-03 | End: 2024-10-31

## 2024-10-21 ENCOUNTER — OFFICE VISIT (OUTPATIENT)
Dept: ORTHOPEDIC SURGERY | Age: 59
End: 2024-10-21
Payer: COMMERCIAL

## 2024-10-21 DIAGNOSIS — M47.816 LUMBAR SPONDYLOSIS: ICD-10-CM

## 2024-10-21 DIAGNOSIS — M47.816 LUMBAR FACET ARTHROPATHY: Primary | ICD-10-CM

## 2024-10-21 PROCEDURE — 64636 DESTROY L/S FACET JNT ADDL: CPT | Performed by: PHYSICAL MEDICINE & REHABILITATION

## 2024-10-21 PROCEDURE — 64635 DESTROY LUMB/SAC FACET JNT: CPT | Performed by: PHYSICAL MEDICINE & REHABILITATION

## 2024-10-21 RX ORDER — TRIAMCINOLONE ACETONIDE 40 MG/ML
40 INJECTION, SUSPENSION INTRA-ARTICULAR; INTRAMUSCULAR ONCE
Status: COMPLETED | OUTPATIENT
Start: 2024-10-21 | End: 2024-10-21

## 2024-10-21 RX ADMIN — TRIAMCINOLONE ACETONIDE 40 MG: 40 INJECTION, SUSPENSION INTRA-ARTICULAR; INTRAMUSCULAR at 08:57

## 2024-10-21 NOTE — PROGRESS NOTES
Name: Leticia Rizo  YOB: 1965  Gender: female  MRN: [id number]    PROCEDURE: Bilateral  L4, L5, and S1 medial branch nerve/facet joint radiofrequency denervation     Precautions:  Leticia Rizo denies prior sensitivity to steroid, local anesthetic, iodine, or shellfish.     She would like to seek surgical consultation with Dr. Burgos.  She was given a copy of her lumbar spine MRI images on CD today to take with her to that appointment.    The procedure was discussed at length with the patient and informed consent was signed and placed in the chart.  She had great relief of her usual low back pain from previous L4-L5 and L5-S1 bilateral  facet joint injections.    The patient was placed in a prone position on the fluoroscopy table and the skin was prepped and draped in a routine sterile fashion with Hibaclens.  The area where the medial branch nerve lies for Right L4 was identified under fluoroscopy, and the area to be injected was anesthetized with 1 mL of 1% Lidocaine.  A 20 gauge 10 cm Neurotherm radiofrequency needle was carefully advanced under fluoroscopic guidance to area where the medial branch nerve for Right L4 lies.  Bony contact was made.  Testing for motor and sensory was negative into the lower extremities and groin to 3 volts.  Motor onset was 0.3 volts. Aspiration was negative.      An injectate of 1 mL of 0.25% marcaine was injected.  Lesioning was carried out for 60 seconds at 80 degrees celsius. The probe was then rotated 180 degrees, and lesioning was repeated. The above procedure was repeated at the levels of the Right  L5, Right S1, left L4, left L5, and left S1 medial branch nerves.      At the level of Right  L5 medial branch nerve, motor onset was 0.4 volts. Testing for motor and sensory was negative into the lower extremities and groin to 3.0 volts at Right L5 medial branch nerve.    At the level of Right S1 medial branch nerve, motor onset was 0.5

## 2024-10-29 ENCOUNTER — PATIENT MESSAGE (OUTPATIENT)
Dept: ORTHOPEDIC SURGERY | Age: 59
End: 2024-10-29

## 2024-10-29 DIAGNOSIS — M54.16 LUMBAR RADICULOPATHY: ICD-10-CM

## 2024-10-29 DIAGNOSIS — M43.10 ACQUIRED SPONDYLOLISTHESIS: ICD-10-CM

## 2024-10-29 DIAGNOSIS — M47.816 LUMBAR FACET ARTHROPATHY: Primary | ICD-10-CM

## 2024-10-29 DIAGNOSIS — M47.816 LUMBAR SPONDYLOSIS: ICD-10-CM

## 2024-11-22 ENCOUNTER — TELEPHONE (OUTPATIENT)
Dept: ORTHOPEDIC SURGERY | Age: 59
End: 2024-11-22

## 2024-11-22 NOTE — TELEPHONE ENCOUNTER
She had a MRI on Friday and needs POA to forward it to Dr. Burgos. They are not able to see it in their system. Let her know if she needs to  a CD.

## 2024-12-02 ENCOUNTER — PATIENT MESSAGE (OUTPATIENT)
Dept: ORTHOPEDIC SURGERY | Age: 59
End: 2024-12-02

## 2024-12-02 DIAGNOSIS — M47.816 LUMBAR SPONDYLOSIS: Primary | ICD-10-CM

## 2024-12-02 DIAGNOSIS — M54.16 LUMBAR RADICULOPATHY: ICD-10-CM

## 2024-12-04 RX ORDER — TIZANIDINE 2 MG/1
TABLET ORAL
Qty: 45 TABLET | Refills: 1 | Status: SHIPPED | OUTPATIENT
Start: 2024-12-04

## 2024-12-16 ENCOUNTER — OFFICE VISIT (OUTPATIENT)
Dept: ORTHOPEDIC SURGERY | Age: 59
End: 2024-12-16
Payer: COMMERCIAL

## 2024-12-16 DIAGNOSIS — M47.816 LUMBAR SPONDYLOSIS: Primary | ICD-10-CM

## 2024-12-16 DIAGNOSIS — M47.816 LUMBAR FACET ARTHROPATHY: ICD-10-CM

## 2024-12-16 PROCEDURE — 64483 NJX AA&/STRD TFRM EPI L/S 1: CPT | Performed by: PHYSICAL MEDICINE & REHABILITATION

## 2024-12-16 RX ORDER — TRIAMCINOLONE ACETONIDE 40 MG/ML
40 INJECTION, SUSPENSION INTRA-ARTICULAR; INTRAMUSCULAR ONCE
Status: COMPLETED | OUTPATIENT
Start: 2024-12-16 | End: 2024-12-16

## 2024-12-16 RX ADMIN — TRIAMCINOLONE ACETONIDE 40 MG: 40 INJECTION, SUSPENSION INTRA-ARTICULAR; INTRAMUSCULAR at 15:46

## 2024-12-16 NOTE — PROGRESS NOTES
Name: Leticia Rizo  YOB: 1965  Gender: female  MRN: [id number]        Transforaminal JESICA Procedure Note    Procedure: Bilateral  L5-S1 transforaminal epidural steroid injections     Precautions: Leticia Rizo denies prior sensitivity to steroid, local anesthetic, iodine, or shellfish.       Consent:  Consent was obtained prior to the procedure. The procedure was discussed at length with Leticia Rizo. She was given the opportunity to ask questions regarding the procedure and its associated risks.  In addition to the potential risks associated with the procedure itself, the patient was informed both verbally and in writing of potential side effects of the use glucocorticoids.  The patient appeared to comprehend the informed consent and desired to have the procedure performed, and informed consent was signed.     She was placed in a prone position on the fluoroscopy table and the skin was prepped and draped in a routine sterile fashion. The areas to be injected were each anesthetized with 1 ml of 1% Lidocaine. A 22 gauge 3.5 inch spinal needle was carefully advanced under fluoroscopic guidance to the right L5-S1 transforaminal space  0.5 ml of 70% of Omnipaque was injected to confirm proper needle placement and absence of subdural or vascular flow Once proper placement was confirmed, 0.5ml of 0.25 marcaine and 40 mg kenalog were injected through the spinal needle.       The above procedure was then repeated at the Left  L5-S1 transforaminal space.    Fluoroscopic guidance was used intermittently over a 10-minute period to insure proper needle placement and her safety. A hard copy of the fluoroscopic image has been placed in her chart and is saved on the C-arm hard drive. She was monitored for 30 minutes after the procedure and discharged home in a stable fashion with a routine follow up.    Procedural Diagnosis:     ICD-10-CM    1. Lumbar spondylosis  M47.816 FL

## 2024-12-17 RX ORDER — TIZANIDINE 2 MG/1
TABLET ORAL
Qty: 270 TABLET | Refills: 1 | Status: SHIPPED | OUTPATIENT
Start: 2024-12-17

## 2025-02-05 ENCOUNTER — APPOINTMENT (OUTPATIENT)
Dept: URBAN - METROPOLITAN AREA CLINIC 330 | Facility: CLINIC | Age: 60
Setting detail: DERMATOLOGY
End: 2025-02-05

## 2025-02-05 DIAGNOSIS — L72.0 EPIDERMAL CYST: ICD-10-CM

## 2025-02-05 DIAGNOSIS — L82.0 INFLAMED SEBORRHEIC KERATOSIS: ICD-10-CM

## 2025-02-05 PROCEDURE — ? LIQUID NITROGEN

## 2025-02-05 PROCEDURE — ? BENIGN DESTRUCTION COSMETIC

## 2025-02-05 PROCEDURE — 17110 DESTRUCTION B9 LES UP TO 14: CPT

## 2025-02-05 PROCEDURE — ? COUNSELING

## 2025-02-05 ASSESSMENT — LOCATION SIMPLE DESCRIPTION DERM
LOCATION SIMPLE: LEFT CLAVICULAR SKIN
LOCATION SIMPLE: RIGHT CHEEK
LOCATION SIMPLE: RIGHT LIP
LOCATION SIMPLE: ABDOMEN

## 2025-02-05 ASSESSMENT — LOCATION DETAILED DESCRIPTION DERM
LOCATION DETAILED: RIGHT LATERAL ABDOMEN
LOCATION DETAILED: RIGHT SUPERIOR MEDIAL BUCCAL CHEEK
LOCATION DETAILED: LEFT CLAVICULAR SKIN
LOCATION DETAILED: RIGHT UPPER CUTANEOUS LIP

## 2025-02-05 ASSESSMENT — LOCATION ZONE DERM
LOCATION ZONE: FACE
LOCATION ZONE: TRUNK
LOCATION ZONE: LIP

## 2025-02-05 NOTE — HPI: ITCHING
What Type Of Note Output Would You Prefer (Optional)?: Bullet Format
How Did Your Itching Occur?: gradual in onset  (over a period of years)
How Severe Is Your Itching?: mild
Additional History: Pt states this spot is consistently itchy. She had a spot removed from this area years ago.

## 2025-02-05 NOTE — PROCEDURE: LIQUID NITROGEN
Include Z78.9 (Other Specified Conditions Influencing Health Status) As An Associated Diagnosis?: No
Consent: The patient's consent was obtained including but not limited to risks of crusting, scabbing, blistering, scarring, darker or lighter pigmentary change, recurrence, incomplete removal and infection.
Show Spray Paint Technique Variable?: Yes
Detail Level: Detailed
Medical Necessity Clause: This procedure was medically necessary because the lesions that were treated were:
Spray Paint Text: The liquid nitrogen was applied to the skin utilizing a spray paint frosting technique.
Medical Necessity Information: It is in your best interest to select a reason for this procedure from the list below. All of these items fulfill various CMS LCD requirements except the new and changing color options.
Post-Care Instructions: I reviewed with the patient in detail post-care instructions. Patient is to wear sunprotection, and avoid picking at any of the treated lesions. Pt may apply Vaseline to crusted or scabbing areas.

## 2025-02-05 NOTE — HPI: EVALUATION OF SKIN LESION(S)
What Type Of Note Output Would You Prefer (Optional)?: Bullet Format
Hpi Title: Evaluation of Skin Lesions
How Severe Are Your Spot(S)?: mild
Additional History: Pt has a spot on her clavicle that is not going away and she would like it removed. Pt is also bothered by a similar white bump on her face. \\n\\nPt complains of an itchy spot on her right side and would like to see if it can be frozen.

## 2025-03-05 ENCOUNTER — OFFICE VISIT (OUTPATIENT)
Age: 60
End: 2025-03-05
Payer: COMMERCIAL

## 2025-03-05 VITALS — WEIGHT: 176 LBS | BODY MASS INDEX: 26.67 KG/M2 | HEIGHT: 68 IN

## 2025-03-05 DIAGNOSIS — M47.816 LUMBAR FACET ARTHROPATHY: Primary | ICD-10-CM

## 2025-03-05 PROCEDURE — 99213 OFFICE O/P EST LOW 20 MIN: CPT | Performed by: PHYSICIAN ASSISTANT

## 2025-03-05 NOTE — PROGRESS NOTES
relatively stable lumbar radicular symptoms and lower back pain.  She will continue her home exercise program daily going forward.  She will follow-up with Dr. Back in 1 year for annual visit, and we can repeat radiofrequency ablation and lumbar injection therapy as needed going forward.

## 2025-03-17 ENCOUNTER — APPOINTMENT (OUTPATIENT)
Dept: URBAN - METROPOLITAN AREA CLINIC 330 | Facility: CLINIC | Age: 60
Setting detail: DERMATOLOGY
End: 2025-03-17

## 2025-03-17 DIAGNOSIS — D22 MELANOCYTIC NEVI: ICD-10-CM

## 2025-03-17 DIAGNOSIS — L82.0 INFLAMED SEBORRHEIC KERATOSIS: ICD-10-CM

## 2025-03-17 DIAGNOSIS — L71.8 OTHER ROSACEA: ICD-10-CM

## 2025-03-17 DIAGNOSIS — L57.8 OTHER SKIN CHANGES DUE TO CHRONIC EXPOSURE TO NONIONIZING RADIATION: ICD-10-CM

## 2025-03-17 DIAGNOSIS — L82.1 OTHER SEBORRHEIC KERATOSIS: ICD-10-CM

## 2025-03-17 DIAGNOSIS — Z80.8 FAMILY HISTORY OF MALIGNANT NEOPLASM OF OTHER ORGANS OR SYSTEMS: ICD-10-CM

## 2025-03-17 PROBLEM — D22.5 MELANOCYTIC NEVI OF TRUNK: Status: ACTIVE | Noted: 2025-03-17

## 2025-03-17 PROCEDURE — ? LIQUID NITROGEN

## 2025-03-17 PROCEDURE — ? TREATMENT REGIMEN

## 2025-03-17 PROCEDURE — ? COUNSELING

## 2025-03-17 PROCEDURE — ? FULL BODY SKIN EXAM

## 2025-03-17 PROCEDURE — 17110 DESTRUCTION B9 LES UP TO 14: CPT

## 2025-03-17 PROCEDURE — 99213 OFFICE O/P EST LOW 20 MIN: CPT | Mod: 25

## 2025-03-17 ASSESSMENT — LOCATION DETAILED DESCRIPTION DERM
LOCATION DETAILED: RIGHT LATERAL ZYGOMA
LOCATION DETAILED: LEFT SUPERIOR LATERAL MALAR CHEEK
LOCATION DETAILED: SUPERIOR THORACIC SPINE
LOCATION DETAILED: INFERIOR THORACIC SPINE
LOCATION DETAILED: LEFT CENTRAL MALAR CHEEK
LOCATION DETAILED: RIGHT MEDIAL PROXIMAL PRETIBIAL REGION
LOCATION DETAILED: LEFT LATERAL PROXIMAL CALF
LOCATION DETAILED: LEFT BUTTOCK

## 2025-03-17 ASSESSMENT — LOCATION SIMPLE DESCRIPTION DERM
LOCATION SIMPLE: UPPER BACK
LOCATION SIMPLE: RIGHT ZYGOMA
LOCATION SIMPLE: LEFT CHEEK
LOCATION SIMPLE: LEFT BUTTOCK
LOCATION SIMPLE: LEFT LOWER LEG
LOCATION SIMPLE: RIGHT PRETIBIAL REGION

## 2025-03-17 ASSESSMENT — LOCATION ZONE DERM
LOCATION ZONE: TRUNK
LOCATION ZONE: LEG
LOCATION ZONE: FACE

## 2025-03-17 NOTE — HPI: EVALUATION OF SKIN LESION(S)
What Type Of Note Output Would You Prefer (Optional)?: Bullet Format
Hpi Title: Evaluation of Skin Lesions
Additional History: Spot on face and behind L ear

## 2025-03-28 ENCOUNTER — PATIENT MESSAGE (OUTPATIENT)
Dept: ORTHOPEDIC SURGERY | Age: 60
End: 2025-03-28

## 2025-03-28 DIAGNOSIS — M47.816 LUMBAR FACET ARTHROPATHY: Primary | ICD-10-CM

## 2025-04-23 ENCOUNTER — PATIENT MESSAGE (OUTPATIENT)
Dept: ORTHOPEDIC SURGERY | Age: 60
End: 2025-04-23

## 2025-04-23 DIAGNOSIS — M47.816 LUMBAR FACET ARTHROPATHY: Primary | ICD-10-CM

## 2025-04-23 RX ORDER — DIAZEPAM 10 MG/1
TABLET ORAL
Qty: 1 TABLET | Refills: 0 | Status: SHIPPED | OUTPATIENT
Start: 2025-04-23 | End: 2025-05-30

## 2025-04-28 ENCOUNTER — OFFICE VISIT (OUTPATIENT)
Dept: ORTHOPEDIC SURGERY | Age: 60
End: 2025-04-28
Payer: COMMERCIAL

## 2025-04-28 DIAGNOSIS — M47.816 LUMBAR FACET ARTHROPATHY: Primary | ICD-10-CM

## 2025-04-28 PROCEDURE — 64636 DESTROY L/S FACET JNT ADDL: CPT | Performed by: PHYSICAL MEDICINE & REHABILITATION

## 2025-04-28 PROCEDURE — 64635 DESTROY LUMB/SAC FACET JNT: CPT | Performed by: PHYSICAL MEDICINE & REHABILITATION

## 2025-04-28 RX ORDER — TRIAMCINOLONE ACETONIDE 40 MG/ML
40 INJECTION, SUSPENSION INTRA-ARTICULAR; INTRAMUSCULAR ONCE
Status: COMPLETED | OUTPATIENT
Start: 2025-04-28 | End: 2025-04-28

## 2025-04-28 RX ADMIN — TRIAMCINOLONE ACETONIDE 40 MG: 40 INJECTION, SUSPENSION INTRA-ARTICULAR; INTRAMUSCULAR at 14:05

## 2025-04-28 NOTE — PROGRESS NOTES
Name: Leticia Rizo  YOB: 1965  Gender: female  MRN: [id number]    PROCEDURE: Bilateral  L4, L5, S1 medial branch nerve/facet joint radiofrequency denervation     Precautions:  Leticia Rizo denies prior sensitivity to steroid, local anesthetic, iodine, or shellfish.     The procedure was discussed at length with the patient and informed consent was signed and placed in the chart.  She had great relief of her usual low back pain from previous L4, L5, S1 bilateral  facet joint injections.    The patient was placed in a prone position on the fluoroscopy table and the skin was prepped and draped in a routine sterile fashion with Hibaclens.  The area where the medial branch nerve lies for Right S1 was identified under fluoroscopy, and the area to be injected was anesthetized with 1 mL of 1% Lidocaine.  A 20 gauge 10 cm Neurotherm radiofrequency needle was carefully advanced under fluoroscopic guidance to area where the medial branch nerve for Right S1 lies.  Bony contact was made.  Testing for motor and sensory was negative into the lower extremities and groin to 3 volts.  Motor onset was 0.6 volts. Aspiration was negative.      An injectate of 1 mL of 0.25% marcaine was injected.  Lesioning was carried out for 60 seconds at 80 degrees celsius. The probe was then rotated 180 degrees, and lesioning was repeated. The above procedure was repeated at the levels of the Right  L5, Right L4, Left S1, Left L5, and Left L4 medial branch nerves.      At the level of Right  L5 medial branch nerve, motor onset was 0.5 volts. Testing for motor and sensory was negative into the lower extremities and groin to 3.0 volts at Right L5 medial branch nerve.    At the level of Right L4 medial branch nerve, motor onset was 0.3 volts. Testing for motor and sensory was negative into the lower extremities and groin to 3.0 volts at Right  L4 medial branch nerve.    At the level of Left  S1 medial branch

## 2025-08-29 ENCOUNTER — PATIENT MESSAGE (OUTPATIENT)
Dept: ORTHOPEDIC SURGERY | Age: 60
End: 2025-08-29

## 2025-08-29 DIAGNOSIS — M54.16 LUMBAR RADICULOPATHY: ICD-10-CM

## 2025-08-29 DIAGNOSIS — M47.816 LUMBAR FACET ARTHROPATHY: Primary | ICD-10-CM

## 2025-08-29 DIAGNOSIS — M43.10 ACQUIRED SPONDYLOLISTHESIS: ICD-10-CM

## 2025-08-29 DIAGNOSIS — M47.816 LUMBAR SPONDYLOSIS: ICD-10-CM
